# Patient Record
Sex: MALE | Race: WHITE | NOT HISPANIC OR LATINO | Employment: OTHER | URBAN - METROPOLITAN AREA
[De-identification: names, ages, dates, MRNs, and addresses within clinical notes are randomized per-mention and may not be internally consistent; named-entity substitution may affect disease eponyms.]

---

## 2017-03-02 DIAGNOSIS — R91.1 LUNG NODULE: Primary | ICD-10-CM

## 2017-03-27 RX ORDER — LISINOPRIL 20 MG/1
20 TABLET ORAL DAILY
Qty: 90 TABLET | Refills: 1 | Status: SHIPPED | OUTPATIENT
Start: 2017-03-27 | End: 2017-11-07 | Stop reason: SDUPTHER

## 2017-03-27 RX ORDER — COLCHICINE 0.6 MG/1
0.6 CAPSULE ORAL EVERY 12 HOURS SCHEDULED
Qty: 180 CAPSULE | Refills: 1 | Status: SHIPPED | OUTPATIENT
Start: 2017-03-27 | End: 2017-04-04 | Stop reason: SDUPTHER

## 2017-04-04 RX ORDER — COLCHICINE 0.6 MG/1
0.6 CAPSULE ORAL EVERY 12 HOURS SCHEDULED
Qty: 180 CAPSULE | Refills: 1 | OUTPATIENT
Start: 2017-04-04 | End: 2017-12-06 | Stop reason: SDUPTHER

## 2017-05-01 ENCOUNTER — APPOINTMENT (OUTPATIENT)
Dept: CT IMAGING | Facility: HOSPITAL | Age: 62
End: 2017-05-01
Attending: INTERNAL MEDICINE

## 2017-06-26 ENCOUNTER — APPOINTMENT (OUTPATIENT)
Dept: CT IMAGING | Facility: HOSPITAL | Age: 62
End: 2017-06-26
Attending: INTERNAL MEDICINE

## 2017-06-26 ENCOUNTER — HOSPITAL ENCOUNTER (OUTPATIENT)
Dept: CT IMAGING | Facility: HOSPITAL | Age: 62
Discharge: HOME | End: 2017-06-26
Attending: INTERNAL MEDICINE | Admitting: INTERNAL MEDICINE

## 2017-06-26 DIAGNOSIS — R91.1 LUNG NODULE: ICD-10-CM

## 2017-06-26 PROCEDURE — 71250 CT THORAX DX C-: CPT

## 2017-06-30 ENCOUNTER — TELEPHONE (OUTPATIENT)
Dept: PULMONOLOGY | Facility: CLINIC | Age: 62
End: 2017-06-30

## 2017-09-22 ENCOUNTER — CLINICAL SUPPORT (OUTPATIENT)
Dept: INTERNAL MEDICINE | Facility: CLINIC | Age: 62
End: 2017-09-22

## 2017-09-22 DIAGNOSIS — Z23 NEED FOR IMMUNIZATION AGAINST INFLUENZA: Primary | ICD-10-CM

## 2017-09-22 PROCEDURE — 90686 IIV4 VACC NO PRSV 0.5 ML IM: CPT | Performed by: INTERNAL MEDICINE

## 2017-09-22 PROCEDURE — 90471 IMMUNIZATION ADMIN: CPT | Performed by: INTERNAL MEDICINE

## 2017-11-07 ENCOUNTER — TELEPHONE (OUTPATIENT)
Dept: INTERNAL MEDICINE | Facility: CLINIC | Age: 62
End: 2017-11-07

## 2017-11-07 RX ORDER — INDOMETHACIN 25 MG/1
25 CAPSULE ORAL
Qty: 21 CAPSULE | Refills: 2 | Status: CANCELLED | OUTPATIENT
Start: 2017-11-07

## 2017-11-07 RX ORDER — LISINOPRIL 20 MG/1
20 TABLET ORAL DAILY
Qty: 30 TABLET | Refills: 0 | Status: SHIPPED | OUTPATIENT
Start: 2017-11-07 | End: 2017-11-07 | Stop reason: SDUPTHER

## 2017-11-07 RX ORDER — LISINOPRIL 20 MG/1
20 TABLET ORAL DAILY
Qty: 30 TABLET | Refills: 0 | Status: SHIPPED | OUTPATIENT
Start: 2017-11-07 | End: 2017-11-08 | Stop reason: SDUPTHER

## 2017-11-07 NOTE — TELEPHONE ENCOUNTER
REFILL REQUEST FOR LISINOPRIL 20MG TAB AND INDOCIN 25MG CAP. LISINOPRIL SENT. OK TO REFILL INDOCIN. ATTEMPTED TO CONTACT PATIENT TO SCHEDULED A F/U APPT, NO ANSWER. LAST 3 SCHEDULED APPTS. CANCELED.

## 2017-11-07 NOTE — TELEPHONE ENCOUNTER
When the patient has not been seen for this long with chronic high blood pressure or other chronic problems I will only give them 10 tablets of medication until they are seen.  No indomethacin as this can cause chronic kidney problems and he needs labs done.

## 2017-11-07 NOTE — TELEPHONE ENCOUNTER
PATIENT SCHEDULED APPT FOR DR ROBISON'S NEXT AVAILABLE, IN December. HE WOULD APPRECIATE THE 30 DAY SUPPLY FOR LISINOPRIL TO HOLD HIM UNTIL THEN.

## 2017-11-08 RX ORDER — LISINOPRIL 20 MG/1
20 TABLET ORAL DAILY
Qty: 30 TABLET | Refills: 0 | Status: SHIPPED | OUTPATIENT
Start: 2017-11-08 | End: 2017-12-06 | Stop reason: SDUPTHER

## 2017-12-06 ENCOUNTER — OFFICE VISIT (OUTPATIENT)
Dept: INTERNAL MEDICINE | Facility: CLINIC | Age: 62
End: 2017-12-06

## 2017-12-06 VITALS
TEMPERATURE: 97.9 F | WEIGHT: 179 LBS | OXYGEN SATURATION: 95 % | HEIGHT: 72 IN | SYSTOLIC BLOOD PRESSURE: 124 MMHG | HEART RATE: 66 BPM | DIASTOLIC BLOOD PRESSURE: 70 MMHG | BODY MASS INDEX: 24.24 KG/M2

## 2017-12-06 DIAGNOSIS — I10 ESSENTIAL HYPERTENSION: Primary | ICD-10-CM

## 2017-12-06 DIAGNOSIS — Z11.59 NEED FOR HEPATITIS C SCREENING TEST: ICD-10-CM

## 2017-12-06 DIAGNOSIS — E55.9 VITAMIN D DEFICIENCY: ICD-10-CM

## 2017-12-06 LAB
25(OH)D3+25(OH)D2 SERPL-MCNC: 43.7 NG/ML
ALBUMIN SERPL-MCNC: 4.7 G/DL (ref 3.5–5)
ALBUMIN/GLOB SERPL: 1.8 G/DL (ref 1–2)
ALP SERPL-CCNC: 63 U/L (ref 38–126)
ALT SERPL-CCNC: 37 U/L (ref 13–69)
AST SERPL-CCNC: 25 U/L (ref 15–46)
BASOPHILS # BLD AUTO: 0.03 10*3/MM3 (ref 0–0.2)
BASOPHILS NFR BLD AUTO: 0.4 % (ref 0–2.5)
BILIRUB SERPL-MCNC: 1 MG/DL (ref 0.2–1.3)
BUN SERPL-MCNC: 29 MG/DL (ref 7–20)
BUN/CREAT SERPL: 26.4 (ref 6.3–21.9)
CALCIUM SERPL-MCNC: 10.5 MG/DL (ref 8.4–10.2)
CHLORIDE SERPL-SCNC: 99 MMOL/L (ref 98–107)
CHOLEST SERPL-MCNC: 145 MG/DL (ref 0–199)
CO2 SERPL-SCNC: 29 MMOL/L (ref 26–30)
CREAT SERPL-MCNC: 1.1 MG/DL (ref 0.6–1.3)
EOSINOPHIL # BLD AUTO: 0.05 10*3/MM3 (ref 0–0.7)
EOSINOPHIL NFR BLD AUTO: 0.6 % (ref 0–7)
ERYTHROCYTE [DISTWIDTH] IN BLOOD BY AUTOMATED COUNT: 12.6 % (ref 11.5–14.5)
GFR SERPLBLD CREATININE-BSD FMLA CKD-EPI: 68 ML/MIN/1.73
GFR SERPLBLD CREATININE-BSD FMLA CKD-EPI: 82 ML/MIN/1.73
GLOBULIN SER CALC-MCNC: 2.6 GM/DL
GLUCOSE SERPL-MCNC: 109 MG/DL (ref 74–98)
HCT VFR BLD AUTO: 49 % (ref 42–52)
HDLC SERPL-MCNC: 40 MG/DL (ref 40–60)
HGB BLD-MCNC: 16.3 G/DL (ref 14–18)
IMM GRANULOCYTES # BLD: 0.02 10*3/MM3 (ref 0–0.06)
IMM GRANULOCYTES NFR BLD: 0.3 % (ref 0–0.6)
LDLC SERPL CALC-MCNC: 88 MG/DL (ref 0–99)
LYMPHOCYTES # BLD AUTO: 1.38 10*3/MM3 (ref 0.6–3.4)
LYMPHOCYTES NFR BLD AUTO: 17.5 % (ref 10–50)
MCH RBC QN AUTO: 30.2 PG (ref 27–31)
MCHC RBC AUTO-ENTMCNC: 33.3 G/DL (ref 30–37)
MCV RBC AUTO: 90.9 FL (ref 80–94)
MONOCYTES # BLD AUTO: 0.4 10*3/MM3 (ref 0–0.9)
MONOCYTES NFR BLD AUTO: 5.1 % (ref 0–12)
NEUTROPHILS # BLD AUTO: 6 10*3/MM3 (ref 2–6.9)
NEUTROPHILS NFR BLD AUTO: 76.1 % (ref 37–80)
NRBC BLD AUTO-RTO: 0 /100 WBC (ref 0–0)
PLATELET # BLD AUTO: 227 10*3/MM3 (ref 130–400)
POTASSIUM SERPL-SCNC: 4.8 MMOL/L (ref 3.5–5.1)
PROT SERPL-MCNC: 7.3 G/DL (ref 6.3–8.2)
RBC # BLD AUTO: 5.39 10*6/MM3 (ref 4.7–6.1)
SODIUM SERPL-SCNC: 141 MMOL/L (ref 137–145)
TRIGL SERPL-MCNC: 86 MG/DL
VLDLC SERPL CALC-MCNC: 17.2 MG/DL
WBC # BLD AUTO: 7.88 10*3/MM3 (ref 4.8–10.8)

## 2017-12-06 PROCEDURE — 99213 OFFICE O/P EST LOW 20 MIN: CPT | Performed by: INTERNAL MEDICINE

## 2017-12-06 RX ORDER — INDOMETHACIN 25 MG/1
25 CAPSULE ORAL
Qty: 270 CAPSULE | Refills: 1 | Status: SHIPPED | OUTPATIENT
Start: 2017-12-06 | End: 2020-09-01

## 2017-12-06 RX ORDER — LISINOPRIL 20 MG/1
20 TABLET ORAL DAILY
Qty: 90 TABLET | Refills: 1 | Status: SHIPPED | OUTPATIENT
Start: 2017-12-06 | End: 2018-07-03 | Stop reason: SDUPTHER

## 2017-12-06 RX ORDER — COLCHICINE 0.6 MG/1
0.6 CAPSULE ORAL EVERY 12 HOURS SCHEDULED
Qty: 180 CAPSULE | Refills: 1 | Status: SHIPPED | OUTPATIENT
Start: 2017-12-06 | End: 2021-10-11

## 2017-12-06 NOTE — PROGRESS NOTES
"Chief Complaint   Patient presents with   • Follow-up     for HTN. No new complaints.      Subjective   Umberto Vigil is a 62 y.o. male.     HPI Comments: Here for follow up of HTN, gout, vit D def, kidney stones, melanoma of left thigh (grade 3) , squamous and basal skin cancer, benign lung lesion per bronchoscopy.   He may have a gout flare every few months.  He may use a few tabs of indocin and colchicine and then flare is resolved.    He has seen Dr Hendrix earlier this yr for mass in lungs.  Has had CT with bronchoscopy.  The mass is benign.   He is taking a MVI and vit D every third day.    No recent kidney stones.   He checks BP every few weeks, about the same as it is today.  No CP, SOB, edema or palpitations.    He does not always awaken to urinate.  No hesitancy or urgency.  No FH of prostate cancer.  His mother  of colon cancer, so did his maternal GM.   He is walking and doing some wt lifting for exercise.   He sees eye doctor and dentist regularly.   His last colonoscopy was in  and is due in  with Dr Almeida.   He has had his flu shot.        The following portions of the patient's history were reviewed and updated as appropriate: allergies, current medications, past family history, past medical history, past social history, past surgical history and problem list.    Review of Systems   Respiratory: Negative for shortness of breath.    Cardiovascular: Negative for chest pain, palpitations and leg swelling.   Gastrointestinal: Negative.    Genitourinary: Negative.    Musculoskeletal:        Occasional gout flares   Psychiatric/Behavioral: The patient is nervous/anxious.    All other systems reviewed and are negative.      Objective   /70  Pulse 66  Temp 97.9 °F (36.6 °C)  Ht 181.6 cm (71.5\")  Wt 81.2 kg (179 lb)  SpO2 95%  BMI 24.62 kg/m2  Body mass index is 24.62 kg/(m^2).  Physical Exam   Constitutional: He is oriented to person, place, and time. He appears well-developed " and well-nourished.   HENT:   Head: Normocephalic and atraumatic.   Right Ear: External ear normal.   Left Ear: External ear normal.   Mouth/Throat: Oropharynx is clear and moist.   Eyes: Conjunctivae and EOM are normal. Pupils are equal, round, and reactive to light.   Neck: Normal range of motion. Neck supple. No thyromegaly present.   Cardiovascular: Normal rate, regular rhythm, normal heart sounds and intact distal pulses.    No murmur heard.  Pulmonary/Chest: Effort normal and breath sounds normal. No respiratory distress.   Abdominal: Soft. Bowel sounds are normal.   Musculoskeletal: He exhibits no edema.   Lymphadenopathy:     He has no cervical adenopathy.   Neurological: He is alert and oriented to person, place, and time. No cranial nerve deficit.   Psychiatric: He has a normal mood and affect. Judgment normal.   Nursing note and vitals reviewed.      Assessment/Plan   Umberto Vigil is here today and the following problems have been addressed:      Umberto was seen today for follow-up.    Diagnoses and all orders for this visit:    Essential hypertension  -     CBC & Differential  -     Comprehensive Metabolic Panel  -     Lipid Panel    Vitamin D deficiency  -     Vitamin D 25 Hydroxy    Need for hepatitis C screening test  -     Hepatitis C Antibody    Other orders  -     colchicine 0.6 MG capsule capsule; Take 1 capsule by mouth Every 12 (Twelve) Hours.  -     indomethacin (INDOCIN) 25 MG capsule; Take 1 capsule by mouth 3 (Three) Times a Day With Meals.  -     lisinopril (PRINIVIL,ZESTRIL) 20 MG tablet; Take 1 tablet by mouth Daily.      Follow heart healthy/low salt diet  Avoid processed foods  Monitor blood pressure as discussed  Exercise as tolerated up to 30 minutes 5 days per week  Take all medications as prescribed  Labs as noted  Continue daily vit D  Will call patient with lab results, as he is anxious about hepatitis C antibody screening test    RTC 6 mo      Please note that portions of this  note were completed with a voice recognition program.  Efforts were made to edit dictation, but occasionally words are mistranscribed.

## 2017-12-07 ENCOUNTER — TELEPHONE (OUTPATIENT)
Dept: INTERNAL MEDICINE | Facility: CLINIC | Age: 62
End: 2017-12-07

## 2017-12-07 LAB — HCV AB S/CO SERPL IA: <0.1 S/CO RATIO (ref 0–0.9)

## 2017-12-07 NOTE — TELEPHONE ENCOUNTER
----- Message from Marilyn K Vermeesch, MD sent at 12/7/2017  7:31 AM EST -----  Please call patient with labs.  Hepatitis C antibody is negative.  Cholesterol panel and vitamin D levels are normal.  Complete blood count is normal.  He appears slightly dehydrated likely due to fasting from labs.  Blood sugar is slightly elevated   at 109.  Last year his blood sugar was slightly elevated at 106.  Normal blood sugar is less than 100.  I would like you to call lab and ask them to add a hemoglobin A1c to blood drawn yesterday.  Please tell him to avoid excessive amounts of sweets   and high carbohydrate foods such as bread, pasta, rice and potatoes.  In addition his calcium level is minimally elevated.  Please tell him to check the amount of vitamin D in his multivitamin.  If there is 800–1000 units I would like him to disconti  nue the extra dose of vitamin D that he takes every third day, as this may be the cause of his higher calcium level.  We will repeat his calcium level at his next office visit in 6 months.  We will call him with the hemoglobin A1c level in a few days  .

## 2017-12-08 ENCOUNTER — TELEPHONE (OUTPATIENT)
Dept: INTERNAL MEDICINE | Facility: CLINIC | Age: 62
End: 2017-12-08

## 2017-12-08 NOTE — TELEPHONE ENCOUNTER
----- Message from Marilyn K Vermeesch, MD sent at 12/8/2017  7:25 AM EST -----  Please tell patient hemoglobin A1c is 5.4 consistent with average blood sugar of 115.  Again explained to him normal is less than 100 and he needs to avoid excessive sweets and carbohydrates as discussed above.

## 2017-12-18 LAB
HBA1C MFR BLD: 5.4 %
WRITTEN AUTHORIZATION: NORMAL

## 2018-07-03 RX ORDER — LISINOPRIL 20 MG/1
TABLET ORAL
Qty: 30 TABLET | Refills: 0 | Status: SHIPPED | OUTPATIENT
Start: 2018-07-03 | End: 2018-08-13 | Stop reason: SDUPTHER

## 2018-07-18 ENCOUNTER — OFFICE VISIT (OUTPATIENT)
Dept: INTERNAL MEDICINE | Facility: CLINIC | Age: 63
End: 2018-07-18

## 2018-07-18 VITALS
HEART RATE: 67 BPM | OXYGEN SATURATION: 97 % | SYSTOLIC BLOOD PRESSURE: 166 MMHG | DIASTOLIC BLOOD PRESSURE: 100 MMHG | TEMPERATURE: 97 F

## 2018-07-18 DIAGNOSIS — T14.8XXA MUSCLE STRAIN: ICD-10-CM

## 2018-07-18 DIAGNOSIS — M77.8 TENDONITIS OF ELBOW, RIGHT: Primary | ICD-10-CM

## 2018-07-18 PROBLEM — Z11.59 NEED FOR HEPATITIS C SCREENING TEST: Status: RESOLVED | Noted: 2017-12-06 | Resolved: 2018-07-18

## 2018-07-18 PROCEDURE — 99214 OFFICE O/P EST MOD 30 MIN: CPT | Performed by: INTERNAL MEDICINE

## 2018-07-18 RX ORDER — METHOCARBAMOL 500 MG/1
500 TABLET, FILM COATED ORAL 2 TIMES DAILY PRN
Qty: 30 TABLET | Refills: 0 | Status: SHIPPED | OUTPATIENT
Start: 2018-07-18 | End: 2018-07-23 | Stop reason: ALTCHOICE

## 2018-07-18 NOTE — PROGRESS NOTES
Chief Complaint   Patient presents with   • Shoulder Pain     right, X 1 week.     Subjective   Umberto Vigil is a 63 y.o. male.     Pt here with complaints of right shoulder pain for one week.   Last week he power washed an awning for an hour or more and noted pain the next day in right shoulder and posterior right upper back.  He has been doing some yard work also.  Has been doing a lot of weed eating, all with right hand.   The pain resolved the following day.  The shoulder pain has been coming and going with use of  mg TID over weekend, then less past few days.  The pain is less later in the day.    Last night the pain recurred during sleep.  Today he has pain in shoulder and forearm.  It radiates to hand also and causes some weakness.           The following portions of the patient's history were reviewed and updated as appropriate: allergies, current medications, past family history, past medical history, past social history, past surgical history and problem list.    Review of Systems   Musculoskeletal: Positive for arthralgias, back pain and myalgias. Negative for joint swelling, neck pain and neck stiffness.        Right shoulder, right upper back, right forearm and right hand pain   Neurological: Positive for weakness. Negative for numbness.       Objective   /100   Pulse 67   Temp 97 °F (36.1 °C)   SpO2 97%   There is no height or weight on file to calculate BMI.  Physical Exam   Constitutional: He appears well-developed and well-nourished.   HENT:   Head: Normocephalic and atraumatic.   Eyes: Pupils are equal, round, and reactive to light. EOM are normal.   Pulmonary/Chest: Effort normal.   Musculoskeletal:   Right shoulder with full range of motion and no significant pain, negative drop test, pain over right rhomboid muscle to palpation, no pain over trapezius muscle, mild pain to palpation of deltoid muscle, no pain over anterior biceps tendon, no pain over acromioclavicular joint,  pain over forearm muscles to palpation, mild pain with supination and pronation of forearm   Neurological: He is alert.   Psychiatric: He has a normal mood and affect. His behavior is normal. Judgment and thought content normal.   Nursing note and vitals reviewed.      Assessment/Plan   Umberto Vigil is here today and the following problems have been addressed:      Umberto was seen today for shoulder pain.    Diagnoses and all orders for this visit:    Tendonitis of elbow, right    Muscle strain    Other orders  -     diclofenac (VOLTAREN) 50 MG EC tablet; Take 1 tablet by mouth 2 (Two) Times a Day As Needed (pain).  -     methocarbamol (ROBAXIN) 500 MG tablet; Take 1 tablet by mouth 2 (Two) Times a Day As Needed for Muscle Spasms.    recommend BID diclofenac and robaxin, take with food  Recommend heat to back follow by tennis ball massage  Limit use of RUE due to tendonitis of forearm  Recommend he avoid overuse of right upper extremity at this time due to tendinitis and muscle spasm    RTC prn if sxs worsen or persist    Please note that portions of this note were completed with a voice recognition program.  Efforts were made to edit dictation, but occasionally words are mistranscribed.

## 2018-07-23 ENCOUNTER — TELEPHONE (OUTPATIENT)
Dept: INTERNAL MEDICINE | Facility: CLINIC | Age: 63
End: 2018-07-23

## 2018-07-23 RX ORDER — TIZANIDINE 2 MG/1
2 TABLET ORAL 2 TIMES DAILY PRN
Qty: 30 TABLET | Refills: 1 | Status: SHIPPED | OUTPATIENT
Start: 2018-07-23 | End: 2019-05-03 | Stop reason: SDUPTHER

## 2018-07-23 NOTE — TELEPHONE ENCOUNTER
Please call in tizanidine 2 mg by mouth twice a day when necessary muscle pain #30 tablets with one refill

## 2018-07-23 NOTE — TELEPHONE ENCOUNTER
Patient called this morning stating that the Methocarbamal he was prescribed was not able to be filled, due to being on back order at a few different pharmacies. He states he was able to get 8, but no luck otherwise. He'd like a call back to know if something else could be ordered.

## 2018-07-27 RX ORDER — DICLOFENAC SODIUM 75 MG/1
75 TABLET, DELAYED RELEASE ORAL 2 TIMES DAILY PRN
Qty: 20 TABLET | Refills: 0 | Status: SHIPPED | OUTPATIENT
Start: 2018-07-27 | End: 2018-08-06 | Stop reason: SDUPTHER

## 2018-07-27 NOTE — TELEPHONE ENCOUNTER
You may call in diclofenac 75 mg by mouth twice a day #20 tablets with one refill.  If he continues to have pain after 2 weeks of this medication I recommend he be seen again for evaluation

## 2018-07-27 NOTE — TELEPHONE ENCOUNTER
Patient called and states he has been having to take diclofenac 3 times a day due to pain. He is wanting to know if he can get the dose increased so he does not have to take it as often.

## 2018-08-06 ENCOUNTER — OFFICE VISIT (OUTPATIENT)
Dept: INTERNAL MEDICINE | Facility: CLINIC | Age: 63
End: 2018-08-06

## 2018-08-06 VITALS
TEMPERATURE: 98 F | SYSTOLIC BLOOD PRESSURE: 136 MMHG | OXYGEN SATURATION: 98 % | HEART RATE: 67 BPM | DIASTOLIC BLOOD PRESSURE: 80 MMHG

## 2018-08-06 DIAGNOSIS — M77.8 TENDONITIS OF ELBOW, RIGHT: Primary | ICD-10-CM

## 2018-08-06 PROCEDURE — 99213 OFFICE O/P EST LOW 20 MIN: CPT | Performed by: INTERNAL MEDICINE

## 2018-08-06 RX ORDER — METHYLPREDNISOLONE 4 MG/1
TABLET ORAL
Qty: 1 EACH | Refills: 0 | Status: SHIPPED | OUTPATIENT
Start: 2018-08-06 | End: 2018-10-15

## 2018-08-06 RX ORDER — DICLOFENAC SODIUM 75 MG/1
75 TABLET, DELAYED RELEASE ORAL 2 TIMES DAILY PRN
Qty: 40 TABLET | Refills: 2 | Status: SHIPPED | OUTPATIENT
Start: 2018-08-06 | End: 2018-10-15

## 2018-08-06 NOTE — PROGRESS NOTES
Chief Complaint   Patient presents with   • Shoulder Pain     and elbow pain, right.      Subjective   Umberto Vigil is a 63 y.o. male.     Pt here today with complaints of right shoulder and elbow pain.   He is having pain in right forearm and shoulder.  He is trying to avoid overuse of right arm as instructed.   Last visit he was given diclofenac and zanaflex and it was helpful.   He is getting some numbness in right lateral 4th and 5th fingers.  He denies any neck pain.  He does have some scapula pain.          The following portions of the patient's history were reviewed and updated as appropriate: allergies, current medications, past family history, past medical history, past social history, past surgical history and problem list.    Review of Systems   Musculoskeletal: Positive for arthralgias and myalgias. Negative for joint swelling, neck pain and neck stiffness.   Neurological: Positive for numbness.       Objective   /80   Pulse 67   Temp 98 °F (36.7 °C)   SpO2 98%   There is no height or weight on file to calculate BMI.  Physical Exam   Constitutional: He is oriented to person, place, and time. He appears well-developed and well-nourished.   HENT:   Head: Normocephalic and atraumatic.   Eyes: Pupils are equal, round, and reactive to light. EOM are normal.   Pulmonary/Chest: Effort normal.   Musculoskeletal:   Right shoulder full range of motion, right elbow with pain over lateral epicondyles, pain with palpation of mid forearm muscles, mild pain with suppination and and pronation   Neurological: He is alert and oriented to person, place, and time.   Psychiatric: He has a normal mood and affect. His behavior is normal. Judgment and thought content normal.   Nursing note and vitals reviewed.      Assessment/Plan   Umberto Vigil is here today and the following problems have been addressed:      Umberto was seen today for shoulder pain.    Diagnoses and all orders for this visit:    Tendonitis of  elbow, right    Other orders  -     MethylPREDNISolone (MEDROLIMTIAZ,) 4 MG tablet; Take as directed on package instructions.  -     diclofenac (VOLTAREN) 75 MG EC tablet; Take 1 tablet by mouth 2 (Two) Times a Day As Needed (pain).      Given RF of diclofenac to take prn with food  May take tizanidine if he feels this is helpful  Also given medrol dose imtiaz  Recommend frictional ice rub if painful or heat late in day    RTC prn  Please note that portions of this note were completed with a voice recognition program.  Efforts were made to edit dictation, but occasionally words are mistranscribed.

## 2018-08-09 ENCOUNTER — TELEPHONE (OUTPATIENT)
Dept: INTERNAL MEDICINE | Facility: CLINIC | Age: 63
End: 2018-08-09

## 2018-08-09 NOTE — TELEPHONE ENCOUNTER
May RF lisinopril 90 day with one RF. He should still have some of medrol regina left.  Do not want to call in more unless he is not better next week.

## 2018-08-09 NOTE — TELEPHONE ENCOUNTER
Patient is requesting additional prednizone because it is helping.  He also needs lacinopril refilled for 90 days.

## 2018-08-13 RX ORDER — LISINOPRIL 20 MG/1
20 TABLET ORAL DAILY
Qty: 90 TABLET | Refills: 1 | Status: SHIPPED | OUTPATIENT
Start: 2018-08-13 | End: 2019-03-08 | Stop reason: SDUPTHER

## 2018-09-18 ENCOUNTER — FLU SHOT (OUTPATIENT)
Dept: INTERNAL MEDICINE | Facility: CLINIC | Age: 63
End: 2018-09-18

## 2018-09-18 DIAGNOSIS — Z23 NEED FOR VACCINATION: Primary | ICD-10-CM

## 2018-09-18 PROCEDURE — 90662 IIV NO PRSV INCREASED AG IM: CPT | Performed by: INTERNAL MEDICINE

## 2018-09-18 PROCEDURE — 90472 IMMUNIZATION ADMIN EACH ADD: CPT | Performed by: INTERNAL MEDICINE

## 2018-09-18 PROCEDURE — 90471 IMMUNIZATION ADMIN: CPT | Performed by: INTERNAL MEDICINE

## 2018-09-18 PROCEDURE — 90632 HEPA VACCINE ADULT IM: CPT | Performed by: INTERNAL MEDICINE

## 2019-03-08 RX ORDER — LISINOPRIL 20 MG/1
20 TABLET ORAL DAILY
Qty: 90 TABLET | Refills: 0 | Status: SHIPPED | OUTPATIENT
Start: 2019-03-08 | End: 2019-05-03 | Stop reason: SDUPTHER

## 2019-03-25 ENCOUNTER — OFFICE VISIT (OUTPATIENT)
Dept: INTERNAL MEDICINE | Facility: CLINIC | Age: 64
End: 2019-03-25

## 2019-03-25 VITALS
TEMPERATURE: 97.7 F | OXYGEN SATURATION: 98 % | HEART RATE: 73 BPM | HEIGHT: 71 IN | WEIGHT: 187 LBS | BODY MASS INDEX: 26.18 KG/M2 | RESPIRATION RATE: 15 BRPM | DIASTOLIC BLOOD PRESSURE: 95 MMHG | SYSTOLIC BLOOD PRESSURE: 151 MMHG

## 2019-03-25 DIAGNOSIS — H60.501 ACUTE OTITIS EXTERNA OF RIGHT EAR, UNSPECIFIED TYPE: Primary | ICD-10-CM

## 2019-03-25 DIAGNOSIS — I10 ESSENTIAL HYPERTENSION: ICD-10-CM

## 2019-03-25 DIAGNOSIS — H61.21 IMPACTED CERUMEN OF RIGHT EAR: ICD-10-CM

## 2019-03-25 PROCEDURE — 69209 REMOVE IMPACTED EAR WAX UNI: CPT | Performed by: NURSE PRACTITIONER

## 2019-03-25 PROCEDURE — 99214 OFFICE O/P EST MOD 30 MIN: CPT | Performed by: NURSE PRACTITIONER

## 2019-04-15 ENCOUNTER — OFFICE VISIT (OUTPATIENT)
Dept: INTERNAL MEDICINE | Facility: CLINIC | Age: 64
End: 2019-04-15

## 2019-04-15 VITALS
OXYGEN SATURATION: 96 % | HEART RATE: 63 BPM | SYSTOLIC BLOOD PRESSURE: 130 MMHG | DIASTOLIC BLOOD PRESSURE: 82 MMHG | TEMPERATURE: 98.1 F

## 2019-04-15 DIAGNOSIS — M54.50 ACUTE MIDLINE LOW BACK PAIN WITHOUT SCIATICA: Primary | ICD-10-CM

## 2019-04-15 PROBLEM — M77.8 TENDONITIS OF ELBOW, RIGHT: Status: RESOLVED | Noted: 2018-07-18 | Resolved: 2019-04-15

## 2019-04-15 PROBLEM — T14.8XXA MUSCLE STRAIN: Status: RESOLVED | Noted: 2018-07-18 | Resolved: 2019-04-15

## 2019-04-15 PROCEDURE — 99214 OFFICE O/P EST MOD 30 MIN: CPT | Performed by: INTERNAL MEDICINE

## 2019-04-15 NOTE — PROGRESS NOTES
Chief Complaint   Patient presents with   • Abstract     Pt would like right ear looked at, states he had his ears flushed on 3/25. Pt states he's been having LBP X 1 month.      Subjective   Umberto Vigil is a 64 y.o. male.     Here with complaints of LBP for past four months.   He was doing a lot of moving furniture at that time and it started to bother him then.   The back is more painful in AM, very stiff.  After an hour it starts to feel better, less stiff and painful.  Later in the day it may worsen on some days.   The pain may radiate to both legs/lateral hips.    No FH of RA that he is aware of.   He has taken IBU for this and it is helpful.  Has been taking 400-800 mg and it works well.   A few weeks ago he went a week with minimal pain, did some yard work and then the pain recurred.    No NTB of legs.  No bowel or bladder complaints.   No weakness or loss of balance    He was here several weeks ago and was given antibiotics after his right ear was flushed and tympanic membrane was noted to be red.  He states he had no earache at that time.  He now complains of mild right ear pain.           The following portions of the patient's history were reviewed and updated as appropriate: allergies, current medications, past family history, past medical history, past social history, past surgical history and problem list.    Review of Systems   Constitutional: Negative for activity change and unexpected weight change.   HENT: Positive for ear pain.    Gastrointestinal: Negative.    Genitourinary: Negative.    Musculoskeletal: Positive for back pain. Negative for gait problem.   Neurological: Negative for numbness.       Objective   /82   Pulse 63   Temp 98.1 °F (36.7 °C)   SpO2 96%   There is no height or weight on file to calculate BMI.  Physical Exam   Constitutional: He is oriented to person, place, and time. He appears well-developed and well-nourished.   Pleasant gentleman who is seated upright and  leaning forward in chair due to low back pain on my arrival to room   HENT:   Head: Normocephalic and atraumatic.   Right Ear: External ear normal.   Left Ear: External ear normal.   Mouth/Throat: Oropharynx is clear and moist. No oropharyngeal exudate.   Right tympanic membrane is slightly erythematous over superior aspect and is minimally dull, left tympanic membrane is normal, no effusions noted   Eyes: EOM are normal. Pupils are equal, round, and reactive to light.   Pulmonary/Chest: Effort normal. No respiratory distress.   Musculoskeletal:   Vertebral tenderness to palpation over mid lumbar spine L2-3 area, mild right sacroiliac tenderness.  No pain with straight leg raise or any hip movement.  Lower extremity muscle strength is 5/5, deep tendon reflexes +1 and symmetric, normal gait   Neurological: He is alert and oriented to person, place, and time. He displays normal reflexes. No cranial nerve deficit or sensory deficit. He exhibits normal muscle tone. Coordination normal.   Psychiatric: He has a normal mood and affect. His behavior is normal. Judgment and thought content normal.   Nursing note and vitals reviewed.      Assessment/Plan   Umberto ISH Yaritza is here today and the following problems have been addressed:      Umberto was seen today for abstract.    Diagnoses and all orders for this visit:    Acute midline low back pain without sciatica  -     Cyclic Citrul Peptide Antibody, IgG / IgA  -     Sedimentation Rate  -     C-reactive Protein  -     XR Spine Lumbar 4+ View; Future      Labs as noted to rule out underlying rheumatoid arthritis as cause of low back pain and stiffness  X-ray of low back due to chronicity of symptoms, this may be DJD versus DDD of low back  Recommend to heat to low back for 20 minutes 3 times daily as needed  Recommend he discontinue ibuprofen and use only Tylenol as needed for low back pain due to underlying history of hypertension and aortic aneurysm  Recommend Zyrtec for  right ear pain likely due to allergies, very minimal erythema on exam  Will contact patient with lab and x-ray results and further plan of care    Return to clinic as needed  Please note that portions of this note were completed with a voice recognition program.  Efforts were made to edit dictation, but occasionally words are mistranscribed.

## 2019-04-17 ENCOUNTER — TELEPHONE (OUTPATIENT)
Dept: INTERNAL MEDICINE | Facility: CLINIC | Age: 64
End: 2019-04-17

## 2019-04-17 NOTE — TELEPHONE ENCOUNTER
Patient called with a question about his blood work.  He is having a flare up of gout and is wondering if he should wait until it goes away or if it's ok to have the blood work done during the flare up?  Phone number verified.  Please advise.  Thank you.

## 2019-04-17 NOTE — TELEPHONE ENCOUNTER
Please tell him I am happy that he called, the flareup will cause some of his lab work to be abnormal.  Please tell him to wait on his lab work until gout flare has resolved.  I assume he is taking his Indocin and colchicine as directed for his gout flare.  This may be very helpful for his back pain also.

## 2019-05-03 ENCOUNTER — OFFICE VISIT (OUTPATIENT)
Dept: INTERNAL MEDICINE | Facility: CLINIC | Age: 64
End: 2019-05-03

## 2019-05-03 VITALS
TEMPERATURE: 98 F | HEART RATE: 76 BPM | SYSTOLIC BLOOD PRESSURE: 142 MMHG | OXYGEN SATURATION: 97 % | HEIGHT: 71 IN | WEIGHT: 185 LBS | DIASTOLIC BLOOD PRESSURE: 88 MMHG | BODY MASS INDEX: 25.9 KG/M2

## 2019-05-03 DIAGNOSIS — Z12.5 SCREENING PSA (PROSTATE SPECIFIC ANTIGEN): ICD-10-CM

## 2019-05-03 DIAGNOSIS — E55.9 VITAMIN D DEFICIENCY: ICD-10-CM

## 2019-05-03 DIAGNOSIS — I10 ESSENTIAL HYPERTENSION: ICD-10-CM

## 2019-05-03 DIAGNOSIS — Z00.00 MEDICARE ANNUAL WELLNESS VISIT, INITIAL: Primary | ICD-10-CM

## 2019-05-03 PROCEDURE — G0009 ADMIN PNEUMOCOCCAL VACCINE: HCPCS | Performed by: INTERNAL MEDICINE

## 2019-05-03 PROCEDURE — G0438 PPPS, INITIAL VISIT: HCPCS | Performed by: INTERNAL MEDICINE

## 2019-05-03 PROCEDURE — 93000 ELECTROCARDIOGRAM COMPLETE: CPT | Performed by: INTERNAL MEDICINE

## 2019-05-03 PROCEDURE — 90670 PCV13 VACCINE IM: CPT | Performed by: INTERNAL MEDICINE

## 2019-05-03 RX ORDER — LISINOPRIL 20 MG/1
20 TABLET ORAL DAILY
Qty: 90 TABLET | Refills: 3 | Status: SHIPPED | OUTPATIENT
Start: 2019-05-03 | End: 2020-06-23 | Stop reason: SDUPTHER

## 2019-05-03 RX ORDER — TIZANIDINE 2 MG/1
2 TABLET ORAL 2 TIMES DAILY PRN
Qty: 60 TABLET | Refills: 1 | Status: SHIPPED | OUTPATIENT
Start: 2019-05-03 | End: 2021-10-11

## 2019-05-03 NOTE — PROGRESS NOTES
QUICK REFERENCE INFORMATION:  The ABCs of the Annual Wellness Visit    Initial Medicare Wellness Visit    HEALTH RISK ASSESSMENT    : 1955    Recent Hospitalizations:  No hospitalization(s) within the last year..  ccc      Current Medical Providers:  Patient Care Team:  Vermeesch, Marilyn K, MD as PCP - General  Vermeesch, Marilyn K, MD as PCP - Family Medicine        Smoking Status:  Social History     Tobacco Use   Smoking Status Former Smoker   • Types: Cigarettes   • Last attempt to quit:    • Years since quittin.3       Alcohol Consumption:  Social History     Substance and Sexual Activity   Alcohol Use No       Depression Screen:   PHQ-2/PHQ-9 Depression Screening 5/3/2019   Little interest or pleasure in doing things 0   Feeling down, depressed, or hopeless 0   Total Score 0       Health Habits and Functional and Cognitive Screening:  Functional & Cognitive Status 5/3/2019   Do you have difficulty preparing food and eating? No   Do you have difficulty bathing yourself, getting dressed or grooming yourself? No   Do you have difficulty using the toilet? No   Do you have difficulty moving around from place to place? No   Do you have trouble with steps or getting out of a bed or a chair? No   In the past year have you fallen or experienced a near fall? No   Current Diet Well Balanced Diet   Dental Exam Up to date   Eye Exam Up to date   Exercise (times per week) 3 times per week   Current Exercise Activities Include Running/Jogging   Do you need help using the phone?  No   Are you deaf or do you have serious difficulty hearing?  No   Do you need help with transportation? No   Do you need help shopping? No   Do you need help preparing meals?  No   Do you need help with housework?  No   Do you need help with laundry? No   Do you need help taking your medications? No   Do you need help managing money? No   Do you ever drive or ride in a car without wearing a seat belt? No   Have you felt unusual  stress, anger or loneliness in the last month? No   Who do you live with? Spouse   If you need help, do you have trouble finding someone available to you? No   Do you have difficulty concentrating, remembering or making decisions? No           Does the patient have evidence of cognitive impairment? No    Asiprin use counseling: Does not need ASA (and currently is not on it)      Recent Lab Results:    Lab Results   Component Value Date     (H) 12/06/2017     Lab Results   Component Value Date    HGBA1C 5.40 12/06/2017     Lab Results   Component Value Date    TRIG 86 12/06/2017    HDL 40 12/06/2017    VLDL 17.2 12/06/2017           Age-appropriate Screening Schedule:  Refer to the list below for future screening recommendations based on patient's age, sex and/or medical conditions. Orders for these recommended tests are listed in the plan section. The patient has been provided with a written plan.    Health Maintenance   Topic Date Due   • ZOSTER VACCINE (2 of 2) 02/25/2013   • PNEUMOCOCCAL VACCINES (65+ LOW/MEDIUM RISK) (1 of 2 - PCV13) 02/15/2019   • INFLUENZA VACCINE  08/01/2019   • COLONOSCOPY  09/30/2020   • TDAP/TD VACCINES (2 - Td) 10/15/2028        Subjective   History of Present Illness    Umberto Vigil is a 64 y.o. male who presents for an Annual Wellness Visit.  PMH of HTN, allergies, vit D def, gout and LBP.  BP is elevated today.  Home reads run 130s/80s.  He had recent gout flare and back pain.  He has been taking some indocin and zanaflex, pain is much improved.  He takes a MVI, but not vit D alone.  He is taking zyrtec for allergies.       The following portions of the patient's history were reviewed and updated as appropriate: allergies, current medications, past family history, past medical history, past social history, past surgical history and problem list.    Outpatient Medications Prior to Visit   Medication Sig Dispense Refill   • colchicine 0.6 MG capsule capsule Take 1 capsule by  mouth Every 12 (Twelve) Hours. 180 capsule 1   • indomethacin (INDOCIN) 25 MG capsule Take 1 capsule by mouth 3 (Three) Times a Day With Meals. 270 capsule 1   • lisinopril (PRINIVIL,ZESTRIL) 20 MG tablet Take 1 tablet by mouth Daily. 200001 90 tablet 0   • tiZANidine (ZANAFLEX) 2 MG tablet Take 1 tablet by mouth 2 (Two) Times a Day As Needed for Muscle Spasms. 30 tablet 1     No facility-administered medications prior to visit.        Patient Active Problem List   Diagnosis   • Vitamin D deficiency   • Hypertension   • Gout   • Abdominal aortic aneurysm (CMS/HCC)   • Allergic rhinitis   • Acute midline low back pain without sciatica       Advance Care Planning:  Patient has an advance directive - a copy has not been provided. Have asked the patient to send this to us to add to record    Identification of Risk Factors:  Risk factors include: weight  and cardiovascular risk.    Review of Systems   Constitutional: Negative.    HENT: Negative.    Eyes: Negative.    Respiratory: Negative.    Cardiovascular: Negative.    Gastrointestinal: Negative.    Endocrine: Negative.    Genitourinary: Negative.    Musculoskeletal: Positive for back pain.   Skin: Negative.    Allergic/Immunologic: Positive for environmental allergies.   Neurological: Negative.    Hematological: Negative.    Psychiatric/Behavioral: Negative.        Compared to one year ago, the patient feels his physical health is the same.  Compared to one year ago, the patient feels his mental health is the same.    Objective     Physical Exam   Constitutional: He is oriented to person, place, and time. He appears well-developed and well-nourished. No distress.   Very pleasant man who appears his stated age, he is in no distress today   HENT:   Head: Normocephalic and atraumatic.   Right Ear: External ear normal.   Left Ear: External ear normal.   Mouth/Throat: Oropharynx is clear and moist. No oropharyngeal exudate.   Tympanic membranes normal   Eyes: Conjunctivae  "and EOM are normal. Pupils are equal, round, and reactive to light.   Neck: Normal range of motion. Neck supple. No thyromegaly present.   Cardiovascular: Normal rate, regular rhythm, normal heart sounds and intact distal pulses.   No murmur heard.  No carotid bruits   Pulmonary/Chest: Effort normal and breath sounds normal. No respiratory distress. He has no wheezes.   Abdominal: Soft. Bowel sounds are normal. He exhibits no distension. There is no tenderness.   Musculoskeletal: Normal range of motion. He exhibits no edema.   Right foot lateral aspect with approximate 1 cm bony nodule with no significant tenderness   Lymphadenopathy:     He has no cervical adenopathy.   Neurological: He is alert and oriented to person, place, and time. He displays normal reflexes. No cranial nerve deficit. Coordination normal.   Psychiatric: He has a normal mood and affect. His behavior is normal. Judgment and thought content normal.   Nursing note and vitals reviewed.    ECG 12 Lead  Date/Time: 5/3/2019 12:29 PM  Performed by: Vermeesch, Marilyn K, MD  Authorized by: Vermeesch, Marilyn K, MD   Comparison: not compared with previous ECG   Rhythm: sinus rhythm  Rate: normal  BPM: 70  Conduction: conduction normal  ST Segments: ST segments normal  T Waves: T waves normal  QRS axis: normal  Other findings: poor R wave progression    Clinical impression: normal ECG            Vitals:    05/03/19 0913   BP: 142/88   Pulse: 76   Temp: 98 °F (36.7 °C)   SpO2: 97%   Weight: 83.9 kg (185 lb)   Height: 180.3 cm (71\")   PainSc: 0-No pain       Patient's Body mass index is 25.8 kg/m². BMI is within normal parameters. No follow-up required..      Assessment/Plan   Patient Self-Management and Personalized Health Advice  The patient has been provided with information about: supplements and preventive services including:   · Diabetes screening, see lab orders, Pneumococcal vaccine , shingrix vaccine at pharmacy.    Visit Diagnoses:  No diagnosis " found.    No orders of the defined types were placed in this encounter.      Outpatient Encounter Medications as of 5/3/2019   Medication Sig Dispense Refill   • colchicine 0.6 MG capsule capsule Take 1 capsule by mouth Every 12 (Twelve) Hours. 180 capsule 1   • indomethacin (INDOCIN) 25 MG capsule Take 1 capsule by mouth 3 (Three) Times a Day With Meals. 270 capsule 1   • lisinopril (PRINIVIL,ZESTRIL) 20 MG tablet Take 1 tablet by mouth Daily. 200001 90 tablet 0   • tiZANidine (ZANAFLEX) 2 MG tablet Take 1 tablet by mouth 2 (Two) Times a Day As Needed for Muscle Spasms. 30 tablet 1     No facility-administered encounter medications on file as of 5/3/2019.        Reviewed use of high risk medication in the elderly: yes  Reviewed for potential of harmful drug interactions in the elderly: not applicable     Copy of living will for chart  Take vit D 1000 u a day  Take shingles vaccine at local pharmacy  Given prevnar vaccine today  Labs as noted  Colonoscopy due next yr with Dr Almeida  EKG done today  Monitor BP, elevated today    Follow Up:  No Follow-up on file.     An After Visit Summary and PPPS with all of these plans were given to the patient.

## 2019-05-29 LAB
25(OH)D3+25(OH)D2 SERPL-MCNC: 29.5 NG/ML
ALBUMIN SERPL-MCNC: 4.3 G/DL (ref 3.5–5)
ALBUMIN/GLOB SERPL: 1.6 G/DL (ref 1–2)
ALP SERPL-CCNC: 64 U/L (ref 38–126)
ALT SERPL-CCNC: 35 U/L (ref 13–69)
AST SERPL-CCNC: 30 U/L (ref 15–46)
BASOPHILS # BLD AUTO: 0.03 10*3/MM3 (ref 0–0.2)
BASOPHILS NFR BLD AUTO: 0.4 % (ref 0–1.5)
BILIRUB SERPL-MCNC: 0.9 MG/DL (ref 0.2–1.3)
BUN SERPL-MCNC: 16 MG/DL (ref 7–20)
BUN/CREAT SERPL: 16 (ref 6.3–21.9)
CALCIUM SERPL-MCNC: 9.7 MG/DL (ref 8.4–10.2)
CHLORIDE SERPL-SCNC: 101 MMOL/L (ref 98–107)
CHOLEST SERPL-MCNC: 141 MG/DL (ref 0–199)
CHOLEST/HDLC SERPL: 4.03 {RATIO}
CO2 SERPL-SCNC: 28 MMOL/L (ref 26–30)
CREAT SERPL-MCNC: 1 MG/DL (ref 0.6–1.3)
EOSINOPHIL # BLD AUTO: 0.17 10*3/MM3 (ref 0–0.4)
EOSINOPHIL NFR BLD AUTO: 2.4 % (ref 0.3–6.2)
ERYTHROCYTE [DISTWIDTH] IN BLOOD BY AUTOMATED COUNT: 13.2 % (ref 12.3–15.4)
GLOBULIN SER CALC-MCNC: 2.7 GM/DL
GLUCOSE SERPL-MCNC: 100 MG/DL (ref 74–98)
HCT VFR BLD AUTO: 46.1 % (ref 37.5–51)
HDLC SERPL-MCNC: 35 MG/DL (ref 40–60)
HGB BLD-MCNC: 15.6 G/DL (ref 13–17.7)
IMM GRANULOCYTES # BLD AUTO: 0.03 10*3/MM3 (ref 0–0.05)
IMM GRANULOCYTES NFR BLD AUTO: 0.4 % (ref 0–0.5)
LDLC SERPL CALC-MCNC: 88 MG/DL (ref 0–99)
LYMPHOCYTES # BLD AUTO: 1.76 10*3/MM3 (ref 0.7–3.1)
LYMPHOCYTES NFR BLD AUTO: 25.3 % (ref 19.6–45.3)
MCH RBC QN AUTO: 30.8 PG (ref 26.6–33)
MCHC RBC AUTO-ENTMCNC: 33.8 G/DL (ref 31.5–35.7)
MCV RBC AUTO: 91.1 FL (ref 79–97)
MONOCYTES # BLD AUTO: 0.45 10*3/MM3 (ref 0.1–0.9)
MONOCYTES NFR BLD AUTO: 6.5 % (ref 5–12)
NEUTROPHILS # BLD AUTO: 4.53 10*3/MM3 (ref 1.7–7)
NEUTROPHILS NFR BLD AUTO: 65 % (ref 42.7–76)
NRBC BLD AUTO-RTO: 0 /100 WBC (ref 0–0.2)
PLATELET # BLD AUTO: 216 10*3/MM3 (ref 140–450)
POTASSIUM SERPL-SCNC: 5 MMOL/L (ref 3.5–5.1)
PROT SERPL-MCNC: 7 G/DL (ref 6.3–8.2)
PSA SERPL-MCNC: 0.74 NG/ML (ref 0.06–4)
RBC # BLD AUTO: 5.06 10*6/MM3 (ref 4.14–5.8)
SODIUM SERPL-SCNC: 140 MMOL/L (ref 137–145)
TRIGL SERPL-MCNC: 90 MG/DL
VLDLC SERPL CALC-MCNC: 18 MG/DL
WBC # BLD AUTO: 6.97 10*3/MM3 (ref 3.4–10.8)

## 2020-01-16 ENCOUNTER — OFFICE VISIT (OUTPATIENT)
Dept: INTERNAL MEDICINE | Facility: CLINIC | Age: 65
End: 2020-01-16

## 2020-01-16 VITALS
HEART RATE: 78 BPM | WEIGHT: 181 LBS | RESPIRATION RATE: 15 BRPM | OXYGEN SATURATION: 99 % | HEIGHT: 71 IN | BODY MASS INDEX: 25.34 KG/M2 | DIASTOLIC BLOOD PRESSURE: 89 MMHG | TEMPERATURE: 97.8 F | SYSTOLIC BLOOD PRESSURE: 155 MMHG

## 2020-01-16 DIAGNOSIS — I10 ESSENTIAL HYPERTENSION: ICD-10-CM

## 2020-01-16 DIAGNOSIS — H93.13 TINNITUS OF BOTH EARS: Primary | ICD-10-CM

## 2020-01-16 DIAGNOSIS — J30.9 ALLERGIC RHINITIS, UNSPECIFIED SEASONALITY, UNSPECIFIED TRIGGER: ICD-10-CM

## 2020-01-16 PROCEDURE — 99214 OFFICE O/P EST MOD 30 MIN: CPT | Performed by: NURSE PRACTITIONER

## 2020-02-03 NOTE — PROGRESS NOTES
Chief Complaint / Reason:      Chief Complaint   Patient presents with   • Tinnitus       Subjective     HPI  Patient presents today with complaints of tinnitus he states that symptoms have been going on for quite some time but have worsened over the past few days and has been interfering with activities of daily living.  He and I discussed differential diagnosis associated with tinnitus and at this time he does not want to try any medication he would like to try home treatments and will contact the office if symptoms get worse.  He does have a history of environmental allergies. vital Signs are stable with exception of elevated blood pressure.  Denies any significant headache blurred vision numbness or tingling.  History taken from: patient    PMH/FH/Social History were reviewed and updated appropriately in the electronic medical record.   Past Medical History:   Diagnosis Date   • Allergy    • Atypical chest pain    • Fracture     h/o   • H/O chest x-ray 08/24/2015    Minimal left basilar scar and atelectasis. Otherwise chronic appearing findings. F/U PA and lateral views would be helpful for a more complete evaluation   • H/O renal calculi    • History of PFTs 09/15/2015    No obstruction or restrictions, normal diffusion   • Hypertension    • Malignant melanoma (CMS/HCC)     h/o   • S/P bronchoscopy 10/14/2015    Left lung was normal. Distortion found in anterior segment of RUL. An endochonchial lavahe was performed. Brushinhs were obtained. Examinamtion was doubtfully normal. Abnormal CT scan of chest   • Squamous cell carcinoma in situ of skin     h/o   • Tinea pedis    • Tinnitus      Past Surgical History:   Procedure Laterality Date   • BRONCHOSCOPY        Status post bronchoscopy 10/14/2015 with pathology and cytology negative for malignancy   • HAND SURGERY     • OTHER SURGICAL HISTORY      Biopsy of skin   • SKIN CANCER EXCISION       Social History     Socioeconomic History   • Marital status:       Spouse name: Not on file   • Number of children: Not on file   • Years of education: Not on file   • Highest education level: Not on file   Tobacco Use   • Smoking status: Former Smoker     Types: Cigarettes     Last attempt to quit:      Years since quittin.1   Substance and Sexual Activity   • Alcohol use: No   • Drug use: No     Family History   Problem Relation Age of Onset   • Cancer Mother         Colon   • Heart attack Father    • Cancer Other    • Heart attack Other        Review of Systems:   Review of Systems   Constitutional: Positive for fatigue.   HENT: Positive for tinnitus.    Respiratory: Negative.    Cardiovascular: Negative.    Allergic/Immunologic: Positive for environmental allergies.   Neurological: Negative.    Psychiatric/Behavioral: Negative.  Positive for stress.         All other systems were reviewed and are negative.  Exceptions are noted in the subjective or above.      Objective     Vital Signs  Vitals:    20 1412   BP: 155/89   Pulse: 78   Resp: 15   Temp: 97.8 °F (36.6 °C)   SpO2: 99%       Body mass index is 25.24 kg/m².    Physical Exam   Constitutional: He is oriented to person, place, and time. He appears well-developed and well-nourished. No distress.   HENT:   Head: Normocephalic and atraumatic.   Right Ear: External ear and ear canal normal. Tympanic membrane is erythematous and bulging. No decreased hearing is noted.   Left Ear: External ear and ear canal normal. Tympanic membrane is erythematous and bulging. No decreased hearing is noted.   Nose: Mucosal edema and rhinorrhea (clear nasal secretions) present. No sinus tenderness. Right sinus exhibits no maxillary sinus tenderness and no frontal sinus tenderness. Left sinus exhibits no maxillary sinus tenderness and no frontal sinus tenderness.   Mouth/Throat: Mucous membranes are dry. Posterior oropharyngeal erythema present.   PND   Eyes: Conjunctivae are normal.   Cardiovascular: Normal rate, regular  rhythm and normal heart sounds.   Pulmonary/Chest: Effort normal and breath sounds normal. No respiratory distress.   Lymphadenopathy:        Head (right side): No submental, no submandibular and no tonsillar adenopathy present.        Head (left side): No submental, no submandibular and no tonsillar adenopathy present.     He has no cervical adenopathy.   Neurological: He is alert and oriented to person, place, and time.   Skin: Skin is warm and dry. Capillary refill takes less than 2 seconds. No rash noted.   Psychiatric: He has a normal mood and affect. His behavior is normal. Judgment and thought content normal.   Nursing note and vitals reviewed.           Results Review:    I reviewed the patient's previous clinical results.       Medication Review:     Current Outpatient Medications:   •  indomethacin (INDOCIN) 25 MG capsule, Take 1 capsule by mouth 3 (Three) Times a Day With Meals., Disp: 270 capsule, Rfl: 1  •  lisinopril (PRINIVIL,ZESTRIL) 20 MG tablet, Take 1 tablet by mouth Daily. 200001, Disp: 90 tablet, Rfl: 3  •  tiZANidine (ZANAFLEX) 2 MG tablet, Take 1 tablet by mouth 2 (Two) Times a Day As Needed for Muscle Spasms., Disp: 60 tablet, Rfl: 1  •  colchicine 0.6 MG capsule capsule, Take 1 capsule by mouth Every 12 (Twelve) Hours., Disp: 180 capsule, Rfl: 1    Assessment/Plan   Umberto was seen today for tinnitus.    Diagnoses and all orders for this visit:    Tinnitus of both ears  Discussed medications that can increase tinnitus symptoms and discussed home care instructions with patient.  He stated understanding and would like to avoid any medications or invasive treatments at this time.  Denies referral also.  Recommend using white noise to alleviate symptoms and recommend stress management.  Allergic rhinitis, unspecified seasonality, unspecified trigger  Advised patient to take allergy medicine over-the-counter such as Claritin or Allegra.  Essential hypertension  Initiate lifestyle modifications.    DASH Diet and exercise.   Compliance with medication regimen and discussed ways to prevent of long-term complications from high blood pressure.  Discussed when to seek medical attention.  Encouraged patient to take blood pressure daily and keep a log.  Discussed association with elevated blood pressure and tinnitus with patient.  Recommend increasing lisinopril if blood pressure remains elevated    Return in about 4 weeks (around 2/13/2020), or if symptoms worsen or fail to improve, for Follow up with Dr. Vermeesch.    Katey Boyle, APRN  01/16/2020

## 2020-06-24 RX ORDER — LISINOPRIL 20 MG/1
20 TABLET ORAL DAILY
Qty: 90 TABLET | Refills: 3 | Status: SHIPPED | OUTPATIENT
Start: 2020-06-24 | End: 2021-09-07

## 2020-07-17 ENCOUNTER — OFFICE VISIT (OUTPATIENT)
Dept: INTERNAL MEDICINE | Facility: CLINIC | Age: 65
End: 2020-07-17

## 2020-07-17 VITALS
HEART RATE: 58 BPM | TEMPERATURE: 97.1 F | WEIGHT: 176 LBS | OXYGEN SATURATION: 98 % | BODY MASS INDEX: 24.64 KG/M2 | HEIGHT: 71 IN | DIASTOLIC BLOOD PRESSURE: 72 MMHG | SYSTOLIC BLOOD PRESSURE: 134 MMHG

## 2020-07-17 DIAGNOSIS — I10 ESSENTIAL HYPERTENSION: ICD-10-CM

## 2020-07-17 DIAGNOSIS — J30.9 ALLERGIC RHINITIS, UNSPECIFIED SEASONALITY, UNSPECIFIED TRIGGER: ICD-10-CM

## 2020-07-17 DIAGNOSIS — Z12.5 SCREENING PSA (PROSTATE SPECIFIC ANTIGEN): ICD-10-CM

## 2020-07-17 DIAGNOSIS — E55.9 VITAMIN D DEFICIENCY: ICD-10-CM

## 2020-07-17 DIAGNOSIS — Z00.00 MEDICARE ANNUAL WELLNESS VISIT, INITIAL: Primary | ICD-10-CM

## 2020-07-17 DIAGNOSIS — M10.072 IDIOPATHIC GOUT OF LEFT FOOT, UNSPECIFIED CHRONICITY: ICD-10-CM

## 2020-07-17 PROCEDURE — G0439 PPPS, SUBSEQ VISIT: HCPCS | Performed by: INTERNAL MEDICINE

## 2020-07-17 PROCEDURE — 99397 PER PM REEVAL EST PAT 65+ YR: CPT | Performed by: INTERNAL MEDICINE

## 2020-07-17 NOTE — PROGRESS NOTES
The ABCs of the Annual Wellness Visit  Subsequent Medicare Wellness Visit    Chief Complaint   Patient presents with   • Medicare Wellness-subsequent       Subjective   History of Present Illness:  Umberto Vigil is a 65 y.o. male who presents for a Subsequent Medicare Wellness Visit.  PMH of HTN, allergies, vitamin D deficiency, gout, history of kidney stones and history of melanoma.  His BP is OK today, runs about the same at home.  No CP, SOA, palpitations.  No recent gout flares.  His back as not been too bothersome.  He does not use much for back pain, occasional IBU before bed as needed.  No current anxiety or depression.      HEALTH RISK ASSESSMENT    Recent Hospitalizations:  No hospitalization(s) within the last year.    Current Medical Providers:  Patient Care Team:  Vermeesch, Marilyn K, MD as PCP - General  Vermeesch, Marilyn K, MD as PCP - Family Medicine    Smoking Status:  Social History     Tobacco Use   Smoking Status Former Smoker   • Types: Cigarettes   • Last attempt to quit:    • Years since quittin.5       Alcohol Consumption:  Social History     Substance and Sexual Activity   Alcohol Use No       Depression Screen:   PHQ-2/PHQ-9 Depression Screening 2020   Little interest or pleasure in doing things 0   Feeling down, depressed, or hopeless 0   Total Score 0       Fall Risk Screen:  STEADI Fall Risk Assessment was completed, and patient is at LOW risk for falls.Assessment completed on:2020    Health Habits and Functional and Cognitive Screening:  Functional & Cognitive Status 2020   Do you have difficulty preparing food and eating? No   Do you have difficulty bathing yourself, getting dressed or grooming yourself? No   Do you have difficulty using the toilet? No   Do you have difficulty moving around from place to place? No   Do you have trouble with steps or getting out of a bed or a chair? No   Current Diet Well Balanced Diet   Dental Exam Up to date   Eye Exam Up  to date   Exercise (times per week) 5 times per week   Current Exercise Activities Include -   Do you need help using the phone?  No   Are you deaf or do you have serious difficulty hearing?  No   Do you need help with transportation? No   Do you need help shopping? No   Do you need help preparing meals?  No   Do you need help with housework?  No   Do you need help with laundry? No   Do you need help taking your medications? No   Do you need help managing money? No   Do you ever drive or ride in a car without wearing a seat belt? No   Have you felt unusual stress, anger or loneliness in the last month? No   Who do you live with? Spouse   If you need help, do you have trouble finding someone available to you? No   Do you have difficulty concentrating, remembering or making decisions? No         Does the patient have evidence of cognitive impairment? No    Asprin use counseling:Does not need ASA (and currently is not on it)    Age-appropriate Screening Schedule:  Refer to the list below for future screening recommendations based on patient's age, sex and/or medical conditions. Orders for these recommended tests are listed in the plan section. The patient has been provided with a written plan.    Health Maintenance   Topic Date Due   • INFLUENZA VACCINE  08/01/2020   • COLONOSCOPY  09/30/2020   • TDAP/TD VACCINES (2 - Td) 10/15/2028   • ZOSTER VACCINE  Completed          The following portions of the patient's history were reviewed and updated as appropriate: allergies, current medications, past family history, past medical history, past social history, past surgical history and problem list.    Outpatient Medications Prior to Visit   Medication Sig Dispense Refill   • colchicine 0.6 MG capsule capsule Take 1 capsule by mouth Every 12 (Twelve) Hours. 180 capsule 1   • indomethacin (INDOCIN) 25 MG capsule Take 1 capsule by mouth 3 (Three) Times a Day With Meals. 270 capsule 1   • lisinopril (PRINIVIL,ZESTRIL) 20 MG  "tablet Take 1 tablet by mouth Daily. 200001 90 tablet 3   • tiZANidine (ZANAFLEX) 2 MG tablet Take 1 tablet by mouth 2 (Two) Times a Day As Needed for Muscle Spasms. 60 tablet 1     No facility-administered medications prior to visit.        Patient Active Problem List   Diagnosis   • Vitamin D deficiency   • Hypertension   • Gout   • Allergic rhinitis   • Acute midline low back pain without sciatica   • Medicare annual wellness visit, initial   • Screening PSA (prostate specific antigen)       Advanced Care Planning:  ACP discussion was held with the patient during this visit. Patient has an advance directive (not in EMR), copy requested.    Review of Systems   Constitutional: Negative.    HENT: Positive for tinnitus.    Eyes: Negative.    Respiratory: Negative.    Cardiovascular: Negative.    Gastrointestinal: Negative.    Genitourinary: Negative.    Musculoskeletal: Positive for back pain.   Skin: Negative.    Allergic/Immunologic: Positive for environmental allergies.   Neurological: Negative.    Hematological: Negative.    Psychiatric/Behavioral: Negative.        Compared to one year ago, the patient feels his physical health is the same.  Compared to one year ago, the patient feels his mental health is the same.    Reviewed chart for potential of high risk medication in the elderly: yes  Reviewed chart for potential of harmful drug interactions in the elderly:yes    Objective         Vitals:    07/17/20 1546   BP: 134/72   Pulse: 58   Temp: 97.1 °F (36.2 °C)   SpO2: 98%   Weight: 79.8 kg (176 lb)   Height: 180.3 cm (71\")   PainSc:   2       Body mass index is 24.55 kg/m².  Discussed the patient's BMI with him. The BMI is in the acceptable range.    Physical Exam   Constitutional: He is oriented to person, place, and time. He appears well-developed and well-nourished. No distress.   Very kind and pleasant man, he is wearing a mask and in no distress today   HENT:   Head: Normocephalic and atraumatic.   Right " Ear: External ear normal.   Left Ear: External ear normal.   TMs are normal   Eyes: Pupils are equal, round, and reactive to light. Conjunctivae and EOM are normal. Right eye exhibits no discharge. Left eye exhibits no discharge.   Neck: Normal range of motion. Neck supple. No thyromegaly present.   Cardiovascular: Normal rate, regular rhythm, normal heart sounds and intact distal pulses.   No murmur heard.  No carotid bruits   Pulmonary/Chest: Effort normal and breath sounds normal. No respiratory distress. He has no wheezes.   Abdominal: Soft. Bowel sounds are normal. He exhibits no distension. There is no tenderness.   Genitourinary:   Genitourinary Comments: Patient declines rectal exam   Musculoskeletal: Normal range of motion. He exhibits no edema.   Lymphadenopathy:     He has no cervical adenopathy.   Neurological: He is alert and oriented to person, place, and time. No cranial nerve deficit or sensory deficit. He exhibits normal muscle tone. Coordination normal.   Skin:   Feet with no callus or ulcer   Psychiatric: He has a normal mood and affect. His behavior is normal. Judgment and thought content normal.   Nursing note and vitals reviewed.            Assessment/Plan   Medicare Risks and Personalized Health Plan  CMS Preventative Services Quick Reference  Cardiovascular risk    The above risks/problems have been discussed with the patient.  Pertinent information has been shared with the patient in the After Visit Summary.  Follow up plans and orders are seen below in the Assessment/Plan Section.    Diagnoses and all orders for this visit:    1. Medicare annual wellness visit, initial (Primary)  -     CBC & Differential  -     Comprehensive Metabolic Panel  -     Lipid Panel With / Chol / HDL Ratio  -     PSA Screen    2. Essential hypertension  -     CBC & Differential  -     Comprehensive Metabolic Panel  -     Lipid Panel With / Chol / HDL Ratio    3. Idiopathic gout of left foot, unspecified  chronicity    4. Screening PSA (prostate specific antigen)  -     PSA Screen    5. Vitamin D deficiency    6. Allergic rhinitis, unspecified seasonality, unspecified trigger    labs as noted  He has a colonoscopy scheduled with Dr Almeida at [facility] in September  He will make an appt with derm for his annual follow up due to history of melanoma  Follow heart healthy/low salt diet  Avoid processed foods  Monitor blood pressure on occasion  Exercise as tolerated up to 30 minutes 5 days per week  Take all medications as prescribed  No recent flare of gout, patient takes Indocin and colchicine as needed when this occurs  Continue tizanidine as needed for back pain as needed  Encourage daily vitamin D  Allergies are not currently bothersome    Follow Up:  Return in about 6 months (around 1/17/2021) for Next scheduled follow up.     An After Visit Summary and PPPS were given to the patient.

## 2020-08-20 DIAGNOSIS — R73.9 ELEVATED BLOOD SUGAR: Primary | ICD-10-CM

## 2020-08-20 LAB
ALBUMIN SERPL-MCNC: 4.6 G/DL (ref 3.5–5.2)
ALBUMIN/GLOB SERPL: 2.7 G/DL
ALP SERPL-CCNC: 72 U/L (ref 39–117)
ALT SERPL-CCNC: 16 U/L (ref 1–41)
AST SERPL-CCNC: 18 U/L (ref 1–40)
BASOPHILS # BLD AUTO: 0.03 10*3/MM3 (ref 0–0.2)
BASOPHILS NFR BLD AUTO: 0.4 % (ref 0–1.5)
BILIRUB SERPL-MCNC: 0.5 MG/DL (ref 0–1.2)
BUN SERPL-MCNC: 17 MG/DL (ref 8–23)
BUN/CREAT SERPL: 16.2 (ref 7–25)
CALCIUM SERPL-MCNC: 9.5 MG/DL (ref 8.6–10.5)
CHLORIDE SERPL-SCNC: 99 MMOL/L (ref 98–107)
CHOLEST SERPL-MCNC: 148 MG/DL (ref 0–200)
CHOLEST/HDLC SERPL: 4 {RATIO}
CO2 SERPL-SCNC: 30.1 MMOL/L (ref 22–29)
CREAT SERPL-MCNC: 1.05 MG/DL (ref 0.76–1.27)
EOSINOPHIL # BLD AUTO: 0.22 10*3/MM3 (ref 0–0.4)
EOSINOPHIL NFR BLD AUTO: 2.8 % (ref 0.3–6.2)
ERYTHROCYTE [DISTWIDTH] IN BLOOD BY AUTOMATED COUNT: 12.9 % (ref 12.3–15.4)
GLOBULIN SER CALC-MCNC: 1.7 GM/DL
GLUCOSE SERPL-MCNC: 115 MG/DL (ref 65–99)
HBA1C MFR BLD: 5.5 % (ref 4.8–5.6)
HCT VFR BLD AUTO: 43.8 % (ref 37.5–51)
HDLC SERPL-MCNC: 37 MG/DL (ref 40–60)
HGB BLD-MCNC: 15.4 G/DL (ref 13–17.7)
IMM GRANULOCYTES # BLD AUTO: 0.02 10*3/MM3 (ref 0–0.05)
IMM GRANULOCYTES NFR BLD AUTO: 0.3 % (ref 0–0.5)
LDLC SERPL CALC-MCNC: 90 MG/DL (ref 0–100)
LYMPHOCYTES # BLD AUTO: 1.76 10*3/MM3 (ref 0.7–3.1)
LYMPHOCYTES NFR BLD AUTO: 22.5 % (ref 19.6–45.3)
Lab: NORMAL
MCH RBC QN AUTO: 30.4 PG (ref 26.6–33)
MCHC RBC AUTO-ENTMCNC: 35.2 G/DL (ref 31.5–35.7)
MCV RBC AUTO: 86.4 FL (ref 79–97)
MONOCYTES # BLD AUTO: 0.5 10*3/MM3 (ref 0.1–0.9)
MONOCYTES NFR BLD AUTO: 6.4 % (ref 5–12)
NEUTROPHILS # BLD AUTO: 5.28 10*3/MM3 (ref 1.7–7)
NEUTROPHILS NFR BLD AUTO: 67.6 % (ref 42.7–76)
NRBC BLD AUTO-RTO: 0 /100 WBC (ref 0–0.2)
PLATELET # BLD AUTO: 212 10*3/MM3 (ref 140–450)
POTASSIUM SERPL-SCNC: 4.7 MMOL/L (ref 3.5–5.2)
PROT SERPL-MCNC: 6.3 G/DL (ref 6–8.5)
PSA SERPL-MCNC: 0.77 NG/ML (ref 0–4)
RBC # BLD AUTO: 5.07 10*6/MM3 (ref 4.14–5.8)
SODIUM SERPL-SCNC: 139 MMOL/L (ref 136–145)
TRIGL SERPL-MCNC: 103 MG/DL (ref 0–150)
VLDLC SERPL CALC-MCNC: 20.6 MG/DL
WBC # BLD AUTO: 7.81 10*3/MM3 (ref 3.4–10.8)
WRITTEN AUTHORIZATION: NORMAL

## 2020-08-20 NOTE — PROGRESS NOTES
Please tell patient his A1c is 5.5 suggesting average blood sugar of 105.  Please tell him to continue a healthy diet with avoidance of excessive amounts of sweets.

## 2020-09-01 ENCOUNTER — HOSPITAL ENCOUNTER (OUTPATIENT)
Dept: GENERAL RADIOLOGY | Facility: HOSPITAL | Age: 65
Discharge: HOME | End: 2020-09-01
Admitting: INTERNAL MEDICINE

## 2020-09-01 ENCOUNTER — OFFICE VISIT (OUTPATIENT)
Dept: INTERNAL MEDICINE | Facility: CLINIC | Age: 65
End: 2020-09-01

## 2020-09-01 VITALS
HEART RATE: 67 BPM | TEMPERATURE: 97.1 F | WEIGHT: 178 LBS | BODY MASS INDEX: 24.92 KG/M2 | DIASTOLIC BLOOD PRESSURE: 82 MMHG | SYSTOLIC BLOOD PRESSURE: 130 MMHG | HEIGHT: 71 IN | OXYGEN SATURATION: 98 %

## 2020-09-01 DIAGNOSIS — M25.561 ACUTE PAIN OF RIGHT KNEE: ICD-10-CM

## 2020-09-01 DIAGNOSIS — M25.552 LEFT HIP PAIN: ICD-10-CM

## 2020-09-01 DIAGNOSIS — M25.552 LEFT HIP PAIN: Primary | ICD-10-CM

## 2020-09-01 PROCEDURE — 73502 X-RAY EXAM HIP UNI 2-3 VIEWS: CPT

## 2020-09-01 PROCEDURE — G0008 ADMIN INFLUENZA VIRUS VAC: HCPCS | Performed by: INTERNAL MEDICINE

## 2020-09-01 PROCEDURE — 90653 IIV ADJUVANT VACCINE IM: CPT | Performed by: INTERNAL MEDICINE

## 2020-09-01 PROCEDURE — 73562 X-RAY EXAM OF KNEE 3: CPT

## 2020-09-01 PROCEDURE — 99213 OFFICE O/P EST LOW 20 MIN: CPT | Performed by: INTERNAL MEDICINE

## 2020-09-01 RX ORDER — MELOXICAM 7.5 MG/1
7.5 TABLET ORAL DAILY
Qty: 90 TABLET | Refills: 1 | Status: SHIPPED | OUTPATIENT
Start: 2020-09-01 | End: 2020-09-03 | Stop reason: SDUPTHER

## 2020-09-01 NOTE — PROGRESS NOTES
Please tell patient that x-ray of hip reveals arthritis changes in hip as well as in his posterior sacroiliac joint.  There is no bone spur mentioned though.  We will see if Mobic/meloxicam is helpful for this pain.

## 2020-09-01 NOTE — PROGRESS NOTES
"Chief Complaint   Patient presents with   • Abstract     Pt states he's had right knee pain off and on for a while now, seems to be getting worse.     Subjective   Umberto Vigil is a 65 y.o. male.     Patient is here today with complaints of right knee pain.  He states the pain is getting worse over past 2 months.  He has not noted any swelling, redness or warmth.  He has not had any injury or overexertion.   He has taken  mg TID for several weeks and it helps pain but the knee is getting worse.  The knee bothers him day and night.    He injured the knee in [employer] playing [employer].      He also has some pain in left hip and low back.  This is worse in the middle of the night when he rolls a certain way.  The hip is worse when he bends over and tries to stand back up.  The more he does the worse it gets.        The following portions of the patient's history were reviewed and updated as appropriate: allergies, current medications, past family history, past medical history, past social history, past surgical history and problem list.    Review of Systems   Constitutional: Positive for activity change. Negative for appetite change, chills and fever.   Musculoskeletal: Positive for arthralgias, back pain and joint swelling.        Left hip pain and right knee pain   Skin: Negative.        Objective   /82   Pulse 67   Temp 97.1 °F (36.2 °C)   Ht 180.3 cm (71\")   Wt 80.7 kg (178 lb)   SpO2 98%   BMI 24.83 kg/m²   Body mass index is 24.83 kg/m².  Physical Exam   Constitutional: He is oriented to person, place, and time. He appears well-developed and well-nourished. No distress.   Very kind and pleasant man, he appears his age and is in no distress today   HENT:   Head: Normocephalic and atraumatic.   Right Ear: External ear normal.   Left Ear: External ear normal.   Eyes: Pupils are equal, round, and reactive to light. EOM are normal.   Neck: Normal range of motion. Neck supple. "   Pulmonary/Chest: Effort normal. No respiratory distress.   Musculoskeletal:   Right knee with small prepatellar effusion noted, full range of motion with no pain, mild pain inferior and medial to patella, no ballottement, negative drawer and Lockman sign  Left hip with no point tenderness, full range of motion with no pain on testing, no pain with pelvic tilt, mild pain over sacroiliac joint to palpation  Muscle strength 5/5 in lower extremities bilaterally   Neurological: He is alert and oriented to person, place, and time. He displays normal reflexes. No cranial nerve deficit or sensory deficit. He exhibits normal muscle tone. Coordination normal.   Skin: No rash noted.   Psychiatric: He has a normal mood and affect. His behavior is normal. Judgment and thought content normal.   Nursing note and vitals reviewed.      Assessment/Plan   Umberto A Yaritza is here today and the following problems have been addressed:      Umberto was seen today for abstract.    Diagnoses and all orders for this visit:    Left hip pain  -     XR Hip With or Without Pelvis 2 - 3 View Left; Future    Acute pain of right knee  -     XR knee 3 vw right; Future    Other orders  -     meloxicam (MOBIC) 7.5 MG tablet; Take 1 tablet by mouth Daily.  -     Fluad Quad 65+ yrs ([id number])    XR of left hip and right knee today-will contact patient with results and further plan of care  Start mobic 7.5 mg daily with food  Recommend knee sleeve on in AM and off in PM  He would like to see Dr Mendez if needed  Flu shot today      Return to clinic in 4 months for routine follow-up or as needed      Please note that portions of this note were completed with a voice recognition program.  Efforts were made to edit dictation, but occasionally words are mistranscribed.  Answers for HPI/ROS submitted by the patient on 8/28/2020   What is the primary reason for your visit?: Other  Please describe your symptoms.: I have right knee paid which has gradually  increased to the point I believe I need to have it looked at to see if I need treatment. I also have persistent pain in left hip or lower back I would like to discuss. Thank you.  Have you had these symptoms before?: Yes  How long have you been having these symptoms?: Greater than 2 weeks  Please list any medications you are currently taking for this condition.: Ibuprofen.  Please describe any probable cause for these symptoms. : I believe physical activity but the knee has gotten significantly worse than normal.

## 2020-09-03 DIAGNOSIS — M25.561 ACUTE PAIN OF RIGHT KNEE: ICD-10-CM

## 2020-09-03 DIAGNOSIS — M25.552 LEFT HIP PAIN: Primary | ICD-10-CM

## 2020-09-03 RX ORDER — MELOXICAM 7.5 MG/1
7.5 TABLET ORAL DAILY
Qty: 90 TABLET | Refills: 1 | Status: SHIPPED | OUTPATIENT
Start: 2020-09-03 | End: 2021-10-11

## 2020-09-03 NOTE — PROGRESS NOTES
Please tell patient he has significant arthritis in his knee with narrowing over the mid knee as well as some bone spurring.  I believe that he would benefit from seeing a orthopedic surgeon.  If he is agreeable I will make referral unless he would like to try physical therapy first.  Physical therapy may be helpful for both his hip and knee.

## 2020-09-25 ENCOUNTER — TELEPHONE (OUTPATIENT)
Dept: INTERNAL MEDICINE | Facility: CLINIC | Age: 65
End: 2020-09-25

## 2020-09-25 NOTE — TELEPHONE ENCOUNTER
PATIENT IS REQUESTING A COPY OF AN XRAY FROM HIS RIGHT KNEE  HE HAS AN APPOINTMENT Monday AT 1:00 WITH DR [facility]EN[facility]   THEY WOULD LIKE TO PICK THIS UP IN THE OFFICE ON Monday  STATED IT WAS DONE DOWNSTAIRS IN THE LAB ON FIRST FLOOR  AND NEEDS TO BE ON A CD    PATIENT CALL BACK [phone]

## 2021-01-18 ENCOUNTER — IMMUNIZATION (OUTPATIENT)
Dept: VACCINE CLINIC | Facility: HOSPITAL | Age: 66
End: 2021-01-18

## 2021-01-18 PROCEDURE — 91300 HC SARSCOV02 VAC 30MCG/0.3ML IM: CPT | Performed by: FAMILY MEDICINE

## 2021-01-18 PROCEDURE — 0001A: CPT | Performed by: FAMILY MEDICINE

## 2021-02-08 ENCOUNTER — IMMUNIZATION (OUTPATIENT)
Dept: VACCINE CLINIC | Facility: HOSPITAL | Age: 66
End: 2021-02-08

## 2021-02-08 PROCEDURE — 0002A: CPT | Performed by: INTERNAL MEDICINE

## 2021-02-08 PROCEDURE — 91300 HC SARSCOV02 VAC 30MCG/0.3ML IM: CPT | Performed by: INTERNAL MEDICINE

## 2021-09-07 RX ORDER — LISINOPRIL 20 MG/1
TABLET ORAL
Qty: 90 TABLET | Refills: 0 | Status: SHIPPED | OUTPATIENT
Start: 2021-09-07 | End: 2021-11-19 | Stop reason: SDUPTHER

## 2021-10-11 ENCOUNTER — TELEMEDICINE (OUTPATIENT)
Dept: INTERNAL MEDICINE | Facility: CLINIC | Age: 66
End: 2021-10-11

## 2021-10-11 VITALS — TEMPERATURE: 99.6 F

## 2021-10-11 DIAGNOSIS — J01.00 SUBACUTE MAXILLARY SINUSITIS: Primary | ICD-10-CM

## 2021-10-11 PROCEDURE — 99213 OFFICE O/P EST LOW 20 MIN: CPT | Performed by: INTERNAL MEDICINE

## 2021-10-11 RX ORDER — AMOXICILLIN 875 MG/1
875 TABLET, COATED ORAL EVERY 12 HOURS SCHEDULED
Qty: 20 TABLET | Refills: 0 | Status: SHIPPED | OUTPATIENT
Start: 2021-10-11 | End: 2021-11-18

## 2021-10-11 NOTE — PROGRESS NOTES
Chief Complaint   Patient presents with   • Abstract     Pt states he's had drainage, cough, congestion, chest congestion, and wakes up every morning with sore throat X 12 days. Pt states he's been taking sudafed every morning, this helps with the drainage but that's it.     Subjective   Umberto ISH Vigil is a 66 y.o. male.     Telemedicine visit with patient today from his home.  He has a ST in AM that resolves after sudafed.  His wife did have URI sxs last week and is better.  He did have COVID vaccine.      Cough  This is a new problem. The current episode started 1 to 4 weeks ago. The problem has been waxing and waning. The problem occurs every few hours. The cough is productive of sputum. Associated symptoms include a fever, headaches, nasal congestion, postnasal drip, rhinorrhea and a sore throat. Pertinent negatives include no chills, ear congestion, ear pain, shortness of breath or wheezing. The symptoms are aggravated by lying down. The treatment provided mild relief.        The following portions of the patient's history were reviewed and updated as appropriate: allergies, current medications, past family history, past medical history, past social history, past surgical history and problem list.    Review of Systems   Constitutional: Positive for fever. Negative for chills.   HENT: Positive for postnasal drip, rhinorrhea, sinus pressure, sinus pain and sore throat. Negative for ear pain.    Respiratory: Positive for cough. Negative for shortness of breath and wheezing.    Cardiovascular: Negative.    Gastrointestinal: Negative.    Neurological: Positive for headaches.       Objective   Temp 99.6 °F (37.6 °C)   There is no height or weight on file to calculate BMI.  Physical Exam  Vitals and nursing note reviewed.   Constitutional:       General: He is not in acute distress.     Appearance: Normal appearance. He is not ill-appearing.      Comments: Kind and pleasant man, appears his age and in no obvious  distress today   HENT:      Head: Normocephalic and atraumatic.      Right Ear: External ear normal.      Left Ear: External ear normal.      Nose:      Comments: Left maxillary sinus tenderness to palpation per patient directed exam  Eyes:      General:         Right eye: No discharge.         Left eye: No discharge.      Extraocular Movements: Extraocular movements intact.   Neck:      Comments: No cervical adenopathy or tenderness per patient directed exam  Pulmonary:      Effort: Pulmonary effort is normal. No respiratory distress.      Comments: Patient speaks in full sentences throughout interview today, no cough or respiratory distress noted  Lymphadenopathy:      Cervical: No cervical adenopathy.   Neurological:      Mental Status: He is alert and oriented to person, place, and time.      Cranial Nerves: No cranial nerve deficit.   Psychiatric:         Mood and Affect: Mood normal.         Behavior: Behavior normal.         Thought Content: Thought content normal.         Judgment: Judgment normal.         Assessment/Plan   Umberto Vigil is here today and the following problems have been addressed:      Diagnoses and all orders for this visit:    1. Subacute maxillary sinusitis (Primary)    Other orders  -     amoxicillin (AMOXIL) 875 MG tablet; Take 1 tablet by mouth Every 12 (Twelve) Hours.  Dispense: 20 tablet; Refill: 0    Patient and wife had similar symptoms over past few weeks  Wife has improved after treatment with antibiotics  His symptoms improve after 1 dose of Sudafed in early morning and then worsen later in the evening and while sleeping at night due to postnasal drip  Will treat with amoxicillin 875 mg twice daily for 10 days  Increase fluids and rest    Return to clinic if symptoms worsen or persist    Time devoted to visit: 12 minutes including time to review previous record, with 75% of time devoted to direct discussion    Patient presents during COVID-19 pandemic/federally declared  state of public health emergency.  This service was conducted via telemedicine video visit via [employer] patient was seen at home due to cough and increased risk of transmission of infection to others.      Please note that portions of this note were completed with a voice recognition program.  Efforts were made to edit dictation, but occasionally words are mistranscribed.You have chosen to receive care through a telehealth visit.  Do you consent to use a video connection for your medical care today? Yes  Active Parties: Marilyn Vermeesch (PCP), Karen Pittman (CMA), and (Pt).  Video visit done via [email or web address], Pt on a computer and Dr. Vermeesch on cell phone.

## 2021-11-18 ENCOUNTER — PRE-ADMISSION TESTING (OUTPATIENT)
Dept: PREADMISSION TESTING | Facility: HOSPITAL | Age: 66
End: 2021-11-18

## 2021-11-18 VITALS — HEIGHT: 71 IN | WEIGHT: 181.4 LBS | BODY MASS INDEX: 25.4 KG/M2

## 2021-11-18 LAB
ANION GAP SERPL CALCULATED.3IONS-SCNC: 9 MMOL/L (ref 5–15)
BUN SERPL-MCNC: 24 MG/DL (ref 8–23)
BUN/CREAT SERPL: 23.5 (ref 7–25)
CALCIUM SPEC-SCNC: 9.8 MG/DL (ref 8.6–10.5)
CHLORIDE SERPL-SCNC: 103 MMOL/L (ref 98–107)
CO2 SERPL-SCNC: 28 MMOL/L (ref 22–29)
CREAT SERPL-MCNC: 1.02 MG/DL (ref 0.76–1.27)
DEPRECATED RDW RBC AUTO: 42 FL (ref 37–54)
ERYTHROCYTE [DISTWIDTH] IN BLOOD BY AUTOMATED COUNT: 12.5 % (ref 12.3–15.4)
GFR SERPL CREATININE-BSD FRML MDRD: 73 ML/MIN/1.73
GLUCOSE SERPL-MCNC: 95 MG/DL (ref 65–99)
HBA1C MFR BLD: 5.6 % (ref 4.8–5.6)
HCT VFR BLD AUTO: 46.1 % (ref 37.5–51)
HGB BLD-MCNC: 15.7 G/DL (ref 13–17.7)
MCH RBC QN AUTO: 31.3 PG (ref 26.6–33)
MCHC RBC AUTO-ENTMCNC: 34.1 G/DL (ref 31.5–35.7)
MCV RBC AUTO: 92 FL (ref 79–97)
PLATELET # BLD AUTO: 224 10*3/MM3 (ref 140–450)
PMV BLD AUTO: 9.4 FL (ref 6–12)
POTASSIUM SERPL-SCNC: 4.6 MMOL/L (ref 3.5–5.2)
QT INTERVAL: 378 MS
QTC INTERVAL: 410 MS
RBC # BLD AUTO: 5.01 10*6/MM3 (ref 4.14–5.8)
SODIUM SERPL-SCNC: 140 MMOL/L (ref 136–145)
WBC NRBC COR # BLD: 9.13 10*3/MM3 (ref 3.4–10.8)

## 2021-11-18 PROCEDURE — 85027 COMPLETE CBC AUTOMATED: CPT

## 2021-11-18 PROCEDURE — 80048 BASIC METABOLIC PNL TOTAL CA: CPT

## 2021-11-18 PROCEDURE — 93010 ELECTROCARDIOGRAM REPORT: CPT | Performed by: INTERNAL MEDICINE

## 2021-11-18 PROCEDURE — 83036 HEMOGLOBIN GLYCOSYLATED A1C: CPT

## 2021-11-18 PROCEDURE — 36415 COLL VENOUS BLD VENIPUNCTURE: CPT

## 2021-11-18 PROCEDURE — 93005 ELECTROCARDIOGRAM TRACING: CPT

## 2021-11-18 RX ORDER — INDOMETHACIN 20 MG/1
CAPSULE ORAL
COMMUNITY
End: 2021-12-03 | Stop reason: HOSPADM

## 2021-11-18 ASSESSMENT — KOOS JR
KOOS JR SCORE: 9
KOOS JR SCORE: 63.776

## 2021-11-18 NOTE — PAT
Patient viewed general PAT education video as instructed in their preoperative information received from their surgeon.  Patient stated the general PAT education video was viewed in its entirety and survey completed.  Copies of PAT general education handouts (Incentive Spirometry, Meds to Beds Program, Patient Belongings, Pre-op skin preparation instructions, Blood Glucose testing, Visitor policy, Surgery FAQ, Code H) distributed to patient if not printed. Education related to the PAT pass and skin preparation for surgery (if applicable) completed in PAT as a reinforcement to PAT education video. Patient instructed to return PAT pass provided today as well as completed skin preparation sheet (if applicable) on the day of procedure.     Additionally if patient had not viewed video yet but intended to view it at home or in our waiting area, then referred them to the handout with QR code/link provided during PAT visit.  Instructed patient to complete survey after viewing the video in its entirety.  Encouraged patient/family to read [facility] general education handouts thoroughly and notify PAT staff with any questions or concerns. Patient verbalized understanding of all information and priority content.    Discussed with patient options for receiving total joint replacement education and assessed patient's ability and preference. Joint Replacement Guide given to patient during PAT visit since not received a copy within the last year. Encouraged patient/family to read guide thoroughly and notify PAT staff with any questions or concerns. Handout provided directing patient to links to watch online videos related to joint replacement surgery on the [facility] website. The handout gives detailed instructions for joining an online joint replacement class through Zoom or phone conference offered on Thursdays. Patient agreed to participate by watching videos online. Patient verbalized understanding of instructions and to  complete the online learning tool survey. Encouraged to share information with family and/or . An overview of the joint replacement education was provided during the visit including general perioperative instructions that are routine for all surgical patients (PAT PASS, wipes, directions to pre-op, etc.).    Patient instructed to drink 20 ounces (or until full) of Gatorade and it needs to be completed 1 hour (for Main OR patients) or 2 hours (scheduled  section patients) before given arrival time for procedure (NO RED Gatorade)    Patient verbalized understanding.'  Patient to apply Chlorhexadine wipes  to surgical area (as instructed) the night before procedure and the AM of procedure. Wipes provided.      Covid test on 21.

## 2021-11-19 ENCOUNTER — OFFICE VISIT (OUTPATIENT)
Dept: INTERNAL MEDICINE | Facility: CLINIC | Age: 66
End: 2021-11-19

## 2021-11-19 VITALS
TEMPERATURE: 97 F | DIASTOLIC BLOOD PRESSURE: 70 MMHG | HEART RATE: 74 BPM | HEIGHT: 71 IN | WEIGHT: 181 LBS | SYSTOLIC BLOOD PRESSURE: 128 MMHG | OXYGEN SATURATION: 97 % | BODY MASS INDEX: 25.34 KG/M2

## 2021-11-19 DIAGNOSIS — L30.9 DERMATITIS: Primary | ICD-10-CM

## 2021-11-19 PROBLEM — M54.50 ACUTE MIDLINE LOW BACK PAIN WITHOUT SCIATICA: Status: RESOLVED | Noted: 2019-04-15 | Resolved: 2021-11-19

## 2021-11-19 PROBLEM — M25.561 ACUTE PAIN OF RIGHT KNEE: Status: RESOLVED | Noted: 2020-09-01 | Resolved: 2021-11-19

## 2021-11-19 PROCEDURE — 99213 OFFICE O/P EST LOW 20 MIN: CPT | Performed by: INTERNAL MEDICINE

## 2021-11-19 RX ORDER — LISINOPRIL 20 MG/1
20 TABLET ORAL DAILY
Qty: 90 TABLET | Refills: 3 | Status: SHIPPED | OUTPATIENT
Start: 2021-11-19 | End: 2022-12-07 | Stop reason: SDUPTHER

## 2021-11-19 RX ORDER — METHYLPREDNISOLONE 4 MG/1
TABLET ORAL
Qty: 1 EACH | Refills: 0 | Status: SHIPPED | OUTPATIENT
Start: 2021-11-19 | End: 2021-12-03 | Stop reason: HOSPADM

## 2021-11-19 NOTE — PROGRESS NOTES
"Chief Complaint   Patient presents with   • Abstract     Pt states he's had a itchy rash on chest for about 2 weeks.      Subjective   Umberto Anuel Vigil is a 66 y.o. male.     Here today with itchy rash on chest for past few weeks.   The rash is over upper chest and mid arms.  Patient states he had itching of upper chest prior to rash developing for several days to a week.  Area is itchy.  He has tried HC 1%, used it only once and again later, seemed to make rash worse.   Also used lubriderm a few times a day for a few days and it did not help.   He cannot remember any new laundry detergent, soaps or dryer sheets.    No one else has rash.  Had COVID booster in August.    No new herbs or supplements.   He took one indocin after rash started as he felt he was getting gout flare, but rash was already present.        The following portions of the patient's history were reviewed and updated as appropriate: allergies, current medications, past family history, past medical history, past social history, past surgical history and problem list.    Review of Systems   Constitutional: Negative for chills and fever.   Musculoskeletal: Negative for arthralgias.   Skin: Positive for rash.       Objective   /70   Pulse 74   Temp 97 °F (36.1 °C)   Ht 180.3 cm (71\")   Wt 82.1 kg (181 lb)   SpO2 97%   BMI 25.24 kg/m²   Body mass index is 25.24 kg/m².  Physical Exam  Vitals and nursing note reviewed.   Constitutional:       General: He is not in acute distress.     Appearance: Normal appearance. He is not ill-appearing.      Comments: Pleasant man, appears his age and in no distress   HENT:      Head: Atraumatic.      Right Ear: External ear normal.      Left Ear: External ear normal.   Eyes:      General:         Right eye: No discharge.         Left eye: No discharge.      Extraocular Movements: Extraocular movements intact.   Pulmonary:      Effort: Pulmonary effort is normal. No respiratory distress.   Skin:     " Findings: Rash present.      Comments: Upper chest with erythematous macular papular rash in U-shaped distribution extending up to neck, upper arms with few red papular lesions   Neurological:      General: No focal deficit present.      Mental Status: He is alert and oriented to person, place, and time. Mental status is at baseline.      Cranial Nerves: No cranial nerve deficit.      Motor: No weakness.      Gait: Gait normal.   Psychiatric:         Mood and Affect: Mood normal.         Behavior: Behavior normal.         Thought Content: Thought content normal.         Judgment: Judgment normal.         Assessment/Plan   Umberto Vigil is here today and the following problems have been addressed:      Diagnoses and all orders for this visit:    1. Dermatitis (Primary)    Other orders  -     methylPREDNISolone (MEDROL) 4 MG dose pack; Take as directed on package instructions.  Dispense: 1 each; Refill: 0  -     lisinopril (PRINIVIL,ZESTRIL) 20 MG tablet; Take 1 tablet by mouth Daily. as directed  Dispense: 90 tablet; Refill: 3    Nonspecific dermatitis, question whether or not this is related to Covid booster vaccine as it occurred approximately 2 to 3 weeks later  Given Medrol Dosepak to take as directed  Also recommend that he mix Cetaphil moisturizing cream with hydrocortisone 1%, 50-50 mix and apply to rash twice daily until resolved    Return to clinic as needed if symptoms worsen or persist for further evaluation    Please note that portions of this note were completed with a voice recognition program.  Efforts were made to edit dictation, but occasionally words are mistranscribed.

## 2021-11-30 ENCOUNTER — APPOINTMENT (OUTPATIENT)
Dept: PREADMISSION TESTING | Facility: HOSPITAL | Age: 66
End: 2021-11-30

## 2021-11-30 LAB — SARS-COV-2 RNA PNL SPEC NAA+PROBE: NOT DETECTED

## 2021-11-30 PROCEDURE — U0004 COV-19 TEST NON-CDC HGH THRU: HCPCS | Performed by: ORTHOPAEDIC SURGERY

## 2021-12-01 ENCOUNTER — ANESTHESIA EVENT (OUTPATIENT)
Dept: PERIOP | Facility: HOSPITAL | Age: 66
End: 2021-12-01

## 2021-12-01 RX ORDER — FAMOTIDINE 10 MG/ML
20 INJECTION, SOLUTION INTRAVENOUS ONCE
Status: CANCELLED | OUTPATIENT
Start: 2021-12-01 | End: 2021-12-01

## 2021-12-02 ENCOUNTER — ANESTHESIA (OUTPATIENT)
Dept: PERIOP | Facility: HOSPITAL | Age: 66
End: 2021-12-02

## 2021-12-02 ENCOUNTER — APPOINTMENT (OUTPATIENT)
Dept: GENERAL RADIOLOGY | Facility: HOSPITAL | Age: 66
End: 2021-12-02

## 2021-12-02 ENCOUNTER — ANESTHESIA EVENT CONVERTED (OUTPATIENT)
Dept: ANESTHESIOLOGY | Facility: HOSPITAL | Age: 66
End: 2021-12-02

## 2021-12-02 ENCOUNTER — HOSPITAL ENCOUNTER (OUTPATIENT)
Facility: HOSPITAL | Age: 66
Discharge: HOME | End: 2021-12-03
Attending: ORTHOPAEDIC SURGERY | Admitting: ORTHOPAEDIC SURGERY

## 2021-12-02 DIAGNOSIS — Z96.651 STATUS POST TOTAL RIGHT KNEE REPLACEMENT: Primary | ICD-10-CM

## 2021-12-02 DIAGNOSIS — M17.11 PRIMARY LOCALIZED OSTEOARTHRITIS OF RIGHT KNEE: ICD-10-CM

## 2021-12-02 PROCEDURE — 25010000002 MORPHINE PER 10 MG: Performed by: ORTHOPAEDIC SURGERY

## 2021-12-02 PROCEDURE — 25010000002 ROPIVACAINE PER 1 MG: Performed by: ORTHOPAEDIC SURGERY

## 2021-12-02 PROCEDURE — 25010000002 ROPIVACINE HCL-NACL: Performed by: NURSE ANESTHETIST, CERTIFIED REGISTERED

## 2021-12-02 PROCEDURE — 25010000002 PROPOFOL 10 MG/ML EMULSION: Performed by: NURSE ANESTHETIST, CERTIFIED REGISTERED

## 2021-12-02 PROCEDURE — C1776 JOINT DEVICE (IMPLANTABLE): HCPCS | Performed by: ORTHOPAEDIC SURGERY

## 2021-12-02 PROCEDURE — 0 CEFAZOLIN IN DEXTROSE 2-4 GM/100ML-% SOLUTION: Performed by: ORTHOPAEDIC SURGERY

## 2021-12-02 PROCEDURE — 25010000002 ONDANSETRON PER 1 MG: Performed by: NURSE ANESTHETIST, CERTIFIED REGISTERED

## 2021-12-02 PROCEDURE — C1713 ANCHOR/SCREW BN/BN,TIS/BN: HCPCS | Performed by: ORTHOPAEDIC SURGERY

## 2021-12-02 PROCEDURE — 97116 GAIT TRAINING THERAPY: CPT

## 2021-12-02 PROCEDURE — C1755 CATHETER, INTRASPINAL: HCPCS | Performed by: ORTHOPAEDIC SURGERY

## 2021-12-02 PROCEDURE — C1889 IMPLANT/INSERT DEVICE, NOC: HCPCS | Performed by: ORTHOPAEDIC SURGERY

## 2021-12-02 PROCEDURE — 25010000002 DEXAMETHASONE PER 1 MG: Performed by: NURSE ANESTHETIST, CERTIFIED REGISTERED

## 2021-12-02 PROCEDURE — 97161 PT EVAL LOW COMPLEX 20 MIN: CPT

## 2021-12-02 PROCEDURE — 73560 X-RAY EXAM OF KNEE 1 OR 2: CPT

## 2021-12-02 DEVICE — CMT BONE PALACOS R HI/VISC 1X40: Type: IMPLANTABLE DEVICE | Site: KNEE | Status: FUNCTIONAL

## 2021-12-02 DEVICE — GENESIS II RESURFACING PATELLAR                                    PROSTHESIS  32MM
Type: IMPLANTABLE DEVICE | Site: KNEE | Status: FUNCTIONAL
Brand: GENESIS II

## 2021-12-02 DEVICE — DEV CONTRL TISS STRATAFIX SYMM PDS PLUS VIL CT-1 45CM: Type: IMPLANTABLE DEVICE | Site: KNEE | Status: FUNCTIONAL

## 2021-12-02 DEVICE — LEGION POSTERIOR STABILIZED                                    OXINIUM FEMORAL SIZE 6 RIGHT
Type: IMPLANTABLE DEVICE | Site: KNEE | Status: FUNCTIONAL
Brand: LEGION

## 2021-12-02 DEVICE — IMPLANTABLE DEVICE: Type: IMPLANTABLE DEVICE | Site: KNEE | Status: FUNCTIONAL

## 2021-12-02 DEVICE — GENESIS II NON-POROUS TIBIAL                                    BASEPLATE SIZE 5 RIGHT
Type: IMPLANTABLE DEVICE | Site: KNEE | Status: FUNCTIONAL
Brand: GENESIS II

## 2021-12-02 DEVICE — DEV CONTRL TISS STRATAFIX SPIRAL PGPCL 2/0FS 30X30CM: Type: IMPLANTABLE DEVICE | Site: KNEE | Status: FUNCTIONAL

## 2021-12-02 DEVICE — LEGION POSTERIOR STABILIZED HIGH                                    FLEX HIGHLY CROSS LINKED                                    POLYETHYLENE SIZE 5-6 9MM
Type: IMPLANTABLE DEVICE | Site: KNEE | Status: FUNCTIONAL
Brand: LEGION

## 2021-12-02 RX ORDER — BUPIVACAINE HYDROCHLORIDE 5 MG/ML
INJECTION, SOLUTION PERINEURAL
Status: COMPLETED | OUTPATIENT
Start: 2021-12-02 | End: 2021-12-02

## 2021-12-02 RX ORDER — LABETALOL HYDROCHLORIDE 5 MG/ML
10 INJECTION, SOLUTION INTRAVENOUS EVERY 4 HOURS PRN
Status: DISCONTINUED | OUTPATIENT
Start: 2021-12-02 | End: 2021-12-03 | Stop reason: HOSPADM

## 2021-12-02 RX ORDER — ACETAMINOPHEN 500 MG
1000 TABLET ORAL EVERY 6 HOURS
Status: DISCONTINUED | OUTPATIENT
Start: 2021-12-02 | End: 2021-12-03 | Stop reason: HOSPADM

## 2021-12-02 RX ORDER — SODIUM CHLORIDE 0.9 % (FLUSH) 0.9 %
10 SYRINGE (ML) INJECTION AS NEEDED
Status: DISCONTINUED | OUTPATIENT
Start: 2021-12-02 | End: 2021-12-02 | Stop reason: HOSPADM

## 2021-12-02 RX ORDER — NALOXONE HCL 0.4 MG/ML
0.1 VIAL (ML) INJECTION
Status: DISCONTINUED | OUTPATIENT
Start: 2021-12-02 | End: 2021-12-03 | Stop reason: HOSPADM

## 2021-12-02 RX ORDER — PROPOFOL 10 MG/ML
VIAL (ML) INTRAVENOUS AS NEEDED
Status: DISCONTINUED | OUTPATIENT
Start: 2021-12-02 | End: 2021-12-02 | Stop reason: SURG

## 2021-12-02 RX ORDER — ONDANSETRON 2 MG/ML
4 INJECTION INTRAMUSCULAR; INTRAVENOUS EVERY 6 HOURS PRN
Status: DISCONTINUED | OUTPATIENT
Start: 2021-12-02 | End: 2021-12-03 | Stop reason: HOSPADM

## 2021-12-02 RX ORDER — SODIUM CHLORIDE 0.9 % (FLUSH) 0.9 %
10 SYRINGE (ML) INJECTION EVERY 12 HOURS SCHEDULED
Status: DISCONTINUED | OUTPATIENT
Start: 2021-12-02 | End: 2021-12-02 | Stop reason: HOSPADM

## 2021-12-02 RX ORDER — BISACODYL 5 MG/1
10 TABLET, DELAYED RELEASE ORAL DAILY PRN
Status: DISCONTINUED | OUTPATIENT
Start: 2021-12-02 | End: 2021-12-03 | Stop reason: HOSPADM

## 2021-12-02 RX ORDER — LIDOCAINE HYDROCHLORIDE 10 MG/ML
INJECTION, SOLUTION EPIDURAL; INFILTRATION; INTRACAUDAL; PERINEURAL AS NEEDED
Status: DISCONTINUED | OUTPATIENT
Start: 2021-12-02 | End: 2021-12-02 | Stop reason: SURG

## 2021-12-02 RX ORDER — MIDAZOLAM HYDROCHLORIDE 1 MG/ML
0.5 INJECTION INTRAMUSCULAR; INTRAVENOUS
Status: DISCONTINUED | OUTPATIENT
Start: 2021-12-02 | End: 2021-12-02 | Stop reason: HOSPADM

## 2021-12-02 RX ORDER — DIPHENHYDRAMINE HCL 25 MG
25 CAPSULE ORAL EVERY 6 HOURS PRN
Status: DISCONTINUED | OUTPATIENT
Start: 2021-12-02 | End: 2021-12-03 | Stop reason: HOSPADM

## 2021-12-02 RX ORDER — ONDANSETRON 2 MG/ML
INJECTION INTRAMUSCULAR; INTRAVENOUS AS NEEDED
Status: DISCONTINUED | OUTPATIENT
Start: 2021-12-02 | End: 2021-12-02 | Stop reason: SURG

## 2021-12-02 RX ORDER — OXYCODONE HYDROCHLORIDE 5 MG/1
10 TABLET ORAL EVERY 4 HOURS PRN
Status: DISCONTINUED | OUTPATIENT
Start: 2021-12-02 | End: 2021-12-03 | Stop reason: HOSPADM

## 2021-12-02 RX ORDER — CEFAZOLIN SODIUM 2 G/100ML
2 INJECTION, SOLUTION INTRAVENOUS ONCE
Status: COMPLETED | OUTPATIENT
Start: 2021-12-02 | End: 2021-12-02

## 2021-12-02 RX ORDER — ONDANSETRON 4 MG/1
4 TABLET, FILM COATED ORAL EVERY 6 HOURS PRN
Status: DISCONTINUED | OUTPATIENT
Start: 2021-12-02 | End: 2021-12-03 | Stop reason: HOSPADM

## 2021-12-02 RX ORDER — HYDROMORPHONE HYDROCHLORIDE 1 MG/ML
0.5 INJECTION, SOLUTION INTRAMUSCULAR; INTRAVENOUS; SUBCUTANEOUS
Status: DISCONTINUED | OUTPATIENT
Start: 2021-12-02 | End: 2021-12-03 | Stop reason: HOSPADM

## 2021-12-02 RX ORDER — BISACODYL 10 MG
10 SUPPOSITORY, RECTAL RECTAL DAILY PRN
Status: DISCONTINUED | OUTPATIENT
Start: 2021-12-02 | End: 2021-12-03 | Stop reason: HOSPADM

## 2021-12-02 RX ORDER — DEXAMETHASONE SODIUM PHOSPHATE 4 MG/ML
INJECTION, SOLUTION INTRA-ARTICULAR; INTRALESIONAL; INTRAMUSCULAR; INTRAVENOUS; SOFT TISSUE AS NEEDED
Status: DISCONTINUED | OUTPATIENT
Start: 2021-12-02 | End: 2021-12-02 | Stop reason: SURG

## 2021-12-02 RX ORDER — CEFAZOLIN SODIUM 2 G/100ML
2 INJECTION, SOLUTION INTRAVENOUS EVERY 8 HOURS
Status: COMPLETED | OUTPATIENT
Start: 2021-12-02 | End: 2021-12-03

## 2021-12-02 RX ORDER — SODIUM CHLORIDE 9 MG/ML
100 INJECTION, SOLUTION INTRAVENOUS CONTINUOUS
Status: DISCONTINUED | OUTPATIENT
Start: 2021-12-02 | End: 2021-12-03 | Stop reason: HOSPADM

## 2021-12-02 RX ORDER — MAGNESIUM HYDROXIDE 1200 MG/15ML
LIQUID ORAL AS NEEDED
Status: DISCONTINUED | OUTPATIENT
Start: 2021-12-02 | End: 2021-12-02 | Stop reason: HOSPADM

## 2021-12-02 RX ORDER — ACETAMINOPHEN 500 MG
1000 TABLET ORAL ONCE
Status: COMPLETED | OUTPATIENT
Start: 2021-12-02 | End: 2021-12-02

## 2021-12-02 RX ORDER — MELOXICAM 7.5 MG/1
15 TABLET ORAL DAILY
Status: DISCONTINUED | OUTPATIENT
Start: 2021-12-02 | End: 2021-12-03 | Stop reason: HOSPADM

## 2021-12-02 RX ORDER — ONDANSETRON 2 MG/ML
4 INJECTION INTRAMUSCULAR; INTRAVENOUS EVERY 6 HOURS PRN
Status: DISCONTINUED | OUTPATIENT
Start: 2021-12-02 | End: 2021-12-02 | Stop reason: SDUPTHER

## 2021-12-02 RX ORDER — ASPIRIN 325 MG
325 TABLET, DELAYED RELEASE (ENTERIC COATED) ORAL DAILY
Status: DISCONTINUED | OUTPATIENT
Start: 2021-12-03 | End: 2021-12-03 | Stop reason: HOSPADM

## 2021-12-02 RX ORDER — OXYCODONE HYDROCHLORIDE 5 MG/1
5 TABLET ORAL EVERY 4 HOURS PRN
Status: DISCONTINUED | OUTPATIENT
Start: 2021-12-02 | End: 2021-12-03 | Stop reason: HOSPADM

## 2021-12-02 RX ORDER — DOCUSATE SODIUM 100 MG/1
100 CAPSULE, LIQUID FILLED ORAL 2 TIMES DAILY PRN
Status: DISCONTINUED | OUTPATIENT
Start: 2021-12-02 | End: 2021-12-03 | Stop reason: HOSPADM

## 2021-12-02 RX ORDER — FAMOTIDINE 20 MG/1
20 TABLET, FILM COATED ORAL ONCE
Status: COMPLETED | OUTPATIENT
Start: 2021-12-02 | End: 2021-12-02

## 2021-12-02 RX ORDER — LIDOCAINE HYDROCHLORIDE 10 MG/ML
0.5 INJECTION, SOLUTION EPIDURAL; INFILTRATION; INTRACAUDAL; PERINEURAL ONCE AS NEEDED
Status: COMPLETED | OUTPATIENT
Start: 2021-12-02 | End: 2021-12-02

## 2021-12-02 RX ORDER — BUPIVACAINE HYDROCHLORIDE 2.5 MG/ML
INJECTION, SOLUTION EPIDURAL; INFILTRATION; INTRACAUDAL
Status: COMPLETED | OUTPATIENT
Start: 2021-12-02 | End: 2021-12-02

## 2021-12-02 RX ORDER — SODIUM CHLORIDE, SODIUM LACTATE, POTASSIUM CHLORIDE, CALCIUM CHLORIDE 600; 310; 30; 20 MG/100ML; MG/100ML; MG/100ML; MG/100ML
9 INJECTION, SOLUTION INTRAVENOUS CONTINUOUS
Status: DISCONTINUED | OUTPATIENT
Start: 2021-12-02 | End: 2021-12-03 | Stop reason: HOSPADM

## 2021-12-02 RX ORDER — LISINOPRIL 20 MG/1
20 TABLET ORAL DAILY
Status: DISCONTINUED | OUTPATIENT
Start: 2021-12-02 | End: 2021-12-03 | Stop reason: HOSPADM

## 2021-12-02 RX ORDER — AMOXICILLIN 250 MG
2 CAPSULE ORAL 2 TIMES DAILY PRN
Status: DISCONTINUED | OUTPATIENT
Start: 2021-12-02 | End: 2021-12-03 | Stop reason: HOSPADM

## 2021-12-02 RX ORDER — PREGABALIN 75 MG/1
75 CAPSULE ORAL ONCE
Status: COMPLETED | OUTPATIENT
Start: 2021-12-02 | End: 2021-12-02

## 2021-12-02 RX ORDER — KETOROLAC TROMETHAMINE 30 MG/ML
15 INJECTION, SOLUTION INTRAMUSCULAR; INTRAVENOUS EVERY 6 HOURS PRN
Status: DISCONTINUED | OUTPATIENT
Start: 2021-12-02 | End: 2021-12-03 | Stop reason: HOSPADM

## 2021-12-02 RX ORDER — DIPHENHYDRAMINE HYDROCHLORIDE 50 MG/ML
25 INJECTION INTRAMUSCULAR; INTRAVENOUS EVERY 6 HOURS PRN
Status: DISCONTINUED | OUTPATIENT
Start: 2021-12-02 | End: 2021-12-03 | Stop reason: HOSPADM

## 2021-12-02 RX ADMIN — BUPIVACAINE HYDROCHLORIDE 30 ML: 2.5 INJECTION, SOLUTION EPIDURAL; INFILTRATION; INTRACAUDAL; PERINEURAL at 13:54

## 2021-12-02 RX ADMIN — PROPOFOL 50 MG: 10 INJECTION, EMULSION INTRAVENOUS at 11:55

## 2021-12-02 RX ADMIN — ROPIVACAINE HYDROCHLORIDE 10 ML/HR: 2 INJECTION, SOLUTION EPIDURAL; INFILTRATION at 13:59

## 2021-12-02 RX ADMIN — LIDOCAINE HYDROCHLORIDE 50 MG: 10 INJECTION, SOLUTION EPIDURAL; INFILTRATION; INTRACAUDAL; PERINEURAL at 11:55

## 2021-12-02 RX ADMIN — PREGABALIN 75 MG: 75 CAPSULE ORAL at 10:59

## 2021-12-02 RX ADMIN — ACETAMINOPHEN 1000 MG: 500 TABLET, FILM COATED ORAL at 23:51

## 2021-12-02 RX ADMIN — OXYCODONE 5 MG: 5 TABLET ORAL at 19:10

## 2021-12-02 RX ADMIN — BUPIVACAINE HYDROCHLORIDE 1.8 ML: 5 INJECTION, SOLUTION PERINEURAL at 11:57

## 2021-12-02 RX ADMIN — PROPOFOL 88 MCG/KG/MIN: 10 INJECTION, EMULSION INTRAVENOUS at 12:08

## 2021-12-02 RX ADMIN — ONDANSETRON 4 MG: 2 INJECTION INTRAMUSCULAR; INTRAVENOUS at 12:44

## 2021-12-02 RX ADMIN — CEFAZOLIN SODIUM 2 G: 2 INJECTION, SOLUTION INTRAVENOUS at 11:50

## 2021-12-02 RX ADMIN — LIDOCAINE HYDROCHLORIDE 0.5 ML: 10 INJECTION, SOLUTION EPIDURAL; INFILTRATION; INTRACAUDAL; PERINEURAL at 10:56

## 2021-12-02 RX ADMIN — FAMOTIDINE 20 MG: 20 TABLET ORAL at 10:59

## 2021-12-02 RX ADMIN — SODIUM CHLORIDE, POTASSIUM CHLORIDE, SODIUM LACTATE AND CALCIUM CHLORIDE: 600; 310; 30; 20 INJECTION, SOLUTION INTRAVENOUS at 12:51

## 2021-12-02 RX ADMIN — DEXAMETHASONE SODIUM PHOSPHATE 8 MG: 4 INJECTION, SOLUTION INTRA-ARTICULAR; INTRALESIONAL; INTRAMUSCULAR; INTRAVENOUS; SOFT TISSUE at 12:08

## 2021-12-02 RX ADMIN — CEFAZOLIN SODIUM 2 G: 2 INJECTION, SOLUTION INTRAVENOUS at 21:19

## 2021-12-02 RX ADMIN — ACETAMINOPHEN 1000 MG: 500 TABLET, FILM COATED ORAL at 19:10

## 2021-12-02 RX ADMIN — ACETAMINOPHEN 1000 MG: 500 TABLET ORAL at 10:59

## 2021-12-02 RX ADMIN — SODIUM CHLORIDE, POTASSIUM CHLORIDE, SODIUM LACTATE AND CALCIUM CHLORIDE 9 ML/HR: 600; 310; 30; 20 INJECTION, SOLUTION INTRAVENOUS at 10:56

## 2021-12-02 RX ADMIN — MELOXICAM 15 MG: 7.5 TABLET ORAL at 19:10

## 2021-12-02 NOTE — ANESTHESIA PROCEDURE NOTES
RIGHT ADDUCTOR CANAL CATHETER      Patient reassessed immediately prior to procedure    Patient location during procedure: post-op  Reason for block: at surgeon's request and post-op pain management  Performed by  CRNA: Viktor Castillo, CRNA  Assisted by: Ila Mir RN  Preanesthetic Checklist  Completed: patient identified, IV checked, site marked, risks and benefits discussed, surgical consent, monitors and equipment checked, pre-op evaluation and timeout performed  Prep:  Pt Position: supine  Sterile barriers:cap, gloves, mask and sterile barriers  Prep: ChloraPrep  Patient monitoring: blood pressure monitoring, continuous pulse oximetry and EKG  Procedure  Performed under: spinal  Guidance:ultrasound guided  Images:still images obtained, printed/placed on chart    Laterality:right  Block Type:adductor canal block  Injection Technique:catheter  Needle Type:Tuohy and echogenic  Needle Gauge:18 G  Resistance on Injection: none  Catheter Size:20 G (20g)  Cath Depth at skin: 9 cm    Medications Used: bupivacaine PF (MARCAINE) 0.25 % injection, 30 mL  Med administered at 12/2/2021 1:54 PM      Post Assessment  Injection Assessment: negative aspiration for heme, incremental injection and no paresthesia on injection  Patient Tolerance:comfortable throughout block  Complications:no  Additional Notes  Procedure:             The pt was placed in the Supine position.  The Insertion site was  prepped and Draped in sterile fashion.  The pt was anesthetized with  IV Sedation( see meds).  Skin and cutaneous tissue was infiltrated and anesthetized with 1% Lidocaine 3 mls via a 25g needle.  A BBraun 4 inch 18g echogenic needle was then  inserted approximately midline, mid-thigh and advanced In-plane with Ultrasound guidance.  Normal Saline PSF was utilized for hydrodissection of tissue.  The Vastus medialis and Sartorius muscle where visualized and the needle tip was placed in the adductor canal,  lateral to the femoral artery.   LA injection spread was visualized, injection was incremental 1-5ml, injection pressure was normal or little, no intraneural injection, no vascular injection.  LA dose was injected thru the needle(see dose above).  A BBraun 20g wire stylet catheter was placed via the needle with ultrasound visualization and confirmation with NS fluid bolus. The catheter insertion site was sealed with exofin tissue adhesive. The labeled catheter was then coiled and secured to skin with benzoin,  steristrips and CHG transparent dressing.  Appropriate labels were applied.  Thank you.

## 2021-12-02 NOTE — H&P
Pre-Op H&P  Umberto Vigil  [id number]  1955      Chief complaint: Right knee pain      Subjective:  Patient is a 66 y.o.male presents for scheduled surgery by Dr. Mendez. He anticipates a TOTAL KNEE ARTHROPLASTY RIGHT today. His knee has been painful for many years. He occasionally uses a knee brace. He had 2 steroid injections with minimal benefit. Denies changes his health since his last office visit.      Review of Systems:  Constitutional-- No fever, chills or sweats. No fatigue.  CV-- No chest pain, palpitation or syncope. +HTN  Resp-- No SOB, cough, hemoptysis  Skin--No rashes or lesions      Allergies: No Known Allergies      Home Meds:  Medications Prior to Admission   Medication Sig Dispense Refill Last Dose   • Indomethacin 20 MG capsule Take  by mouth.   Past Month at Unknown time   • lisinopril (PRINIVIL,ZESTRIL) 20 MG tablet Take 1 tablet by mouth Daily. as directed 90 tablet 3 12/1/2021 at 0800   • methylPREDNISolone (MEDROL) 4 MG dose pack Take as directed on package instructions. 1 each 0          PMH:   Past Medical History:   Diagnosis Date   • Allergy    • Atypical chest pain    • Fracture     h/o   • H/O chest x-ray 08/24/2015    Minimal left basilar scar and atelectasis. Otherwise chronic appearing findings. F/U PA and lateral views would be helpful for a more complete evaluation   • H/O renal calculi    • History of PFTs 09/15/2015    No obstruction or restrictions, normal diffusion   • Hypertension    • Malignant melanoma (HCC)     h/o   • S/P bronchoscopy 10/14/2015    Left lung was normal. Distortion found in anterior segment of RUL. An endochonchial lavahe was performed. Brushinhs were obtained. Examinamtion was doubtfully normal. Abnormal CT scan of chest   • Squamous cell carcinoma in situ of skin     h/o   • Tinea pedis    • Tinnitus      PSH:    Past Surgical History:   Procedure Laterality Date   • BRONCHOSCOPY        Status post bronchoscopy 10/14/2015 with pathology and  "cytology negative for malignancy   • COLONOSCOPY     • HAND SURGERY     • OTHER SURGICAL HISTORY      Biopsy of skin   • SKIN CANCER EXCISION         Immunization History:  Influenza:   Pneumococcal: UTD  Tetanus: UTD  Covid x3:     Social History:   Tobacco:   Social History     Tobacco Use   Smoking Status Former Smoker   • Types: Cigarettes   • Quit date:    • Years since quittin.9   Smokeless Tobacco Never Used      Alcohol:     Social History     Substance and Sexual Activity   Alcohol Use Yes   • Alcohol/week: 2.0 - 3.0 standard drinks   • Types: 2 - 3 Cans of beer per week    Comment: once every few weeks         Physical Exam:/90 (BP Location: Right arm, Patient Position: Lying)   Pulse 71   Temp 97 °F (36.1 °C) (Temporal)   Resp 18   Ht 180.3 cm (71\")   Wt 79.4 kg (175 lb)   SpO2 99%   BMI 24.41 kg/m²       General Appearance:    Alert, cooperative, no distress, appears stated age   Head:    Normocephalic, without obvious abnormality, atraumatic   Lungs:     Clear to auscultation bilaterally, respirations unlabored    Heart:   Regular rate and rhythm, S1 and S2 normal    Abdomen:    Soft without tenderness   Extremities:   Extremities normal, atraumatic, no cyanosis or edema   Skin:   Skin color, texture, turgor normal, no rashes or lesions   Neurologic:   Grossly intact     Results Review:     LABS:  Lab Results   Component Value Date    WBC 9.13 2021    HGB 15.7 2021    HCT 46.1 2021    MCV 92.0 2021     2021    NEUTROABS 5.28 2020    GLUCOSE 95 2021    BUN 24 (H) 2021    CREATININE 1.02 2021    EGFRIFNONA 73 2021    EGFRIFAFRI 86 2020     2021    K 4.6 2021     2021    CO2 28.0 2021    CALCIUM 9.8 2021    ALBUMIN 4.60 2020    AST 18 2020    ALT 16 2020    BILITOT 0.5 2020       RADIOLOGY:  Imaging Results (Last 72 Hours)     ** No results " found for the last 72 hours. **          I reviewed the patient's new clinical results.    Cancer Staging (if applicable)  Cancer Patient: __ yes __no __unknown; If yes, clinical stage T:__ N:__M:__, stage group or __N/A      Impression: Right knee pain      Plan: TOTAL KNEE ARTHROPLASTY RIGHT      Carley AMY Clifton   12/2/2021   11:16 EST

## 2021-12-02 NOTE — ANESTHESIA PROCEDURE NOTES
Spinal Block      Patient reassessed immediately prior to procedure    Patient location during procedure: OR  Indication:at surgeon's request  Performed By  CRNA: Emily Garcia CRNA  Preanesthetic Checklist  Completed: patient identified, IV checked, site marked, risks and benefits discussed, surgical consent, monitors and equipment checked, pre-op evaluation and timeout performed  Spinal Block Prep:  Patient Position:sitting  Sterile Tech:cap, gloves, sterile barriers and mask  Prep:Chloraprep  Patient Monitoring:blood pressure monitoring, continuous pulse oximetry and EKG  Spinal Block Procedure  Approach:midline  Guidance:landmark technique and palpation technique  Location:L4-L5  Needle Type:Sprotte  Needle Gauge:25 G  Placement of Spinal needle event:cerebrospinal fluid aspirated  Paresthesia: no  Fluid Appearance:clear  Medications: bupivacaine (MARCAINE) 0.5 % injection, 1.8 mL  Med Administered at 12/2/2021 11:57 AM   Post Assessment  Patient Tolerance:patient tolerated the procedure well with no apparent complications  Complications no  Additional Notes  Procedure:  Pt assisted to sitting position, with legs in position of comfort over side of bed.  Pt. instructed in optimal spine presentation, the spine was prepped/ Draped and the skin at insertion site was anesthetized with 1% Lidocaine 2 ml.  The spinal needle was then advanced until CSF flow was obtained and LA was injected:

## 2021-12-02 NOTE — THERAPY EVALUATION
Patient Name: Umberto Vigil  : 1955    MRN: [id number]                              Today's Date: 2021       Admit Date: 2021    Visit Dx: No diagnosis found.  Patient Active Problem List   Diagnosis   • Vitamin D deficiency   • Hypertension   • Gout   • Allergic rhinitis   • Medicare annual wellness visit, initial   • Screening PSA (prostate specific antigen)   • Left hip pain   • Dermatitis   • Osteoarthritis of right knee   • Primary localized osteoarthritis of right knee   • Status post total right knee replacement     Past Medical History:   Diagnosis Date   • Allergy    • Atypical chest pain    • Fracture     h/o   • H/O chest x-ray 2015    Minimal left basilar scar and atelectasis. Otherwise chronic appearing findings. F/U PA and lateral views would be helpful for a more complete evaluation   • H/O renal calculi    • History of PFTs 09/15/2015    No obstruction or restrictions, normal diffusion   • Hypertension    • Malignant melanoma (HCC)     h/o   • S/P bronchoscopy 10/14/2015    Left lung was normal. Distortion found in anterior segment of RUL. An endochonchial lavahe was performed. Brushinhs were obtained. Examinamtion was doubtfully normal. Abnormal CT scan of chest   • Squamous cell carcinoma in situ of skin     h/o   • Tinea pedis    • Tinnitus      Past Surgical History:   Procedure Laterality Date   • BRONCHOSCOPY        Status post bronchoscopy 10/14/2015 with pathology and cytology negative for malignancy   • COLONOSCOPY     • HAND SURGERY     • OTHER SURGICAL HISTORY      Biopsy of skin   • SKIN CANCER EXCISION        General Information     Row Name 21 1630          Physical Therapy Time and Intention    Document Type evaluation  -NOEMY     Mode of Treatment individual therapy; physical therapy  -NOEMY     Row Name 21 6080          General Information    Patient Profile Reviewed yes  -NOEMY     Prior Level of Function min assist:; all household mobility; transfer;  bed mobility; ADL's  -NOEMY     Existing Precautions/Restrictions other (see comments)  R adductor nerve cath  -     Barriers to Rehab none identified  -     Row Name 12/02/21 1630          Living Environment    Lives With spouse  -     Row Name 12/02/21 1630          Home Main Entrance    Number of Stairs, Main Entrance three  -NOEMY     Stair Railings, Main Entrance railing on left side (ascending)  -     Row Name 12/02/21 1630          Stairs Within Home, Primary    Stairs, Within Home, Primary 0  -NOEMY     Number of Stairs, Within Home, Primary none  -NOEMY     Row Name 12/02/21 1630          Cognition    Orientation Status (Cognition) oriented x 4  -NOEMY     Row Name 12/02/21 1630          Safety Issues, Functional Mobility    Safety Issues Affecting Function (Mobility) safety precaution awareness; safety precautions follow-through/compliance  -     Impairments Affecting Function (Mobility) endurance/activity tolerance; strength; pain; range of motion (ROM)  -           User Key  (r) = Recorded By, (t) = Taken By, (c) = Cosigned By    Initials Name Provider Type    NOEYM Boone Mackay PT Physical Therapist               Mobility     Row Name 12/02/21 1630          Bed Mobility    Bed Mobility scooting/bridging; supine-sit; sit-supine  -NOEMY     Scooting/Bridging [place] (Bed Mobility) supervision; verbal cues  -NOEMY     Supine-Sit [place] (Bed Mobility) supervision; verbal cues  -NOEMY     Sit-Supine [place] (Bed Mobility) supervision; verbal cues  -NOEMY     Assistive Device (Bed Mobility) bed rails; head of bed elevated  -     Comment (Bed Mobility) Verbal cues for LE sequencing off of/onto EOB and trunk control into sitting/supine  -     Row Name 12/02/21 1630          Transfers    Comment (Transfers) Verbal cues for safe hand placement during standing/sitting and moving R LE out for comfort prior to sitting  -     Row Name 12/02/21 1630          Sit-Stand Transfer    Sit-Stand [place] (Transfers)  verbal cues; contact guard  -     Assistive Device (Sit-Stand Transfers) walker, front-wheeled  -     Row Name 12/02/21 1630          Gait/Stairs (Locomotion)    [place] Level (Gait) verbal cues; contact guard; 1 person to manage equipment  -     Assistive Device (Gait) walker, front-wheeled  -     Distance in Feet (Gait) 200  -NOEMY     Deviations/Abnormal Patterns (Gait) bilateral deviations; carmenza decreased; gait speed decreased; stride length decreased  -     Bilateral Gait Deviations forward flexed posture  -     Right Sided Gait Deviations heel strike decreased; weight shift ability decreased  -     [place] Level (Stairs) not tested  -     Comment (Gait/Stairs) Pt ambulated with step through pattern and decreased speed. Verbal cues for maintaining upright posture, body within walker, increase step length, and WB through LEs. Gait limited by fatigue. No knee buckling noted.  -     Row Name 12/02/21 1630          Mobility    Extremity Weight-bearing Status right lower extremity  -     Right Lower Extremity (Weight-bearing Status) weight-bearing as tolerated (WBAT)  -           User Key  (r) = Recorded By, (t) = Taken By, (c) = Cosigned By    Initials Name Provider Type    NOEMY Boone Mackay, PT Physical Therapist               Obj/Interventions     Row Name 12/02/21 1630          Range of Motion Comprehensive    General Range of Motion lower extremity range of motion deficits identified  -     Comment, General Range of Motion R LE AROM impaired 25%; L LE AROM WFL; able to actively DF/PF  -     Row Name 12/02/21 1630          Strength Comprehensive (MMT)    General Manual Muscle Testing (MMT) Assessment lower extremity strength deficits identified  -     Comment, General Manual Muscle Testing (MMT) Assessment R LE functionally 4-/5; L LE functionally 4+/5; IND with SLR  -     Row Name 12/02/21 1630          Motor Skills    Therapeutic Exercise hip; knee; ankle  -     Row Name  12/02/21 1630          Hip (Therapeutic Exercise)    Hip (Therapeutic Exercise) isometric exercises  -     Hip Isometrics (Therapeutic Exercise) gluteal sets; 10 repetitions  -     Row Name 12/02/21 1630          Knee (Therapeutic Exercise)    Knee (Therapeutic Exercise) isometric exercises  -NOEMY     Knee Isometrics (Therapeutic Exercise) quad sets; 10 repetitions  -     Row Name 12/02/21 1630          Ankle (Therapeutic Exercise)    Ankle (Therapeutic Exercise) AROM (active range of motion)  -     Ankle AROM (Therapeutic Exercise) bilateral; dorsiflexion; plantarflexion; 10 repetitions  -     Row Name 12/02/21 1630          Sensory Assessment (Somatosensory)    Sensory Assessment (Somatosensory) LE sensation intact  -           User Key  (r) = Recorded By, (t) = Taken By, (c) = Cosigned By    Initials Name Provider Type    NOEMY Boone Mackay, PT Physical Therapist               Goals/Plan     Row Name 12/02/21 1630          Bed Mobility Goal 1 (PT)    Activity/Assistive Device (Bed Mobility Goal 1, PT) sit to supine/supine to sit  -NOEMY     [place] Level/Cues Needed (Bed Mobility Goal 1, PT) modified independence  -NOEMY     Time Frame (Bed Mobility Goal 1, PT) long term goal (LTG); 3 days  -     Row Name 12/02/21 1630          Transfer Goal 1 (PT)    Activity/Assistive Device (Transfer Goal 1, PT) sit-to-stand/stand-to-sit; walker, rolling  -NOEMY     [place] Level/Cues Needed (Transfer Goal 1, PT) modified independence  -NOEMY     Time Frame (Transfer Goal 1, PT) long term goal (LTG); 3 days  -     Row Name 12/02/21 1630          Gait Training Goal 1 (PT)    Activity/Assistive Device (Gait Training Goal 1, PT) gait (walking locomotion); walker, rolling  -NOEMY     [place] Level (Gait Training Goal 1, PT) modified independence  -NOEMY     Distance (Gait Training Goal 1, PT) 300 feet  -NOEMY     Time Frame (Gait Training Goal 1, PT) long term goal (LTG); 3 days  -     Row Name 12/02/21 1630          ROM Goal  1 (PT)    ROM Goal 1 (PT) R knee AROM 0-90 degrees  -NOEMY     Time Frame (ROM Goal 1, PT) long-term goal (LTG); 3 days  -NOEMY     Row Name 12/02/21 1630          Stairs Goal 1 (PT)    Activity/Assistive Device (Stairs Goal 1, PT) stairs, all skills; using handrail, left; cane, straight  -NOEMY     [place] Level/Cues Needed (Stairs Goal 1, PT) contact guard assist  -NOEMY     Number of Stairs (Stairs Goal 1, PT) 3  -NOEMY     Time Frame (Stairs Goal 1, PT) long term goal (LTG); 3 days  -NOEMY           User Key  (r) = Recorded By, (t) = Taken By, (c) = Cosigned By    Initials Name Provider Type    Boone Vizcarra, PT Physical Therapist               Clinical Impression     Row Name 12/02/21 1630          Pain    Additional Documentation Pain Scale: Numbers Pre/Post-Treatment (Group)  -     Row Name 12/02/21 1630          Pain Scale: Numbers Pre/Post-Treatment    Pretreatment Pain Rating 1/10  -NOEMY     Posttreatment Pain Rating 2/10  -NOEMY     Pain Location - Side Right  -NOEMY     Pain Location - Orientation anterior  -NOEMY     Pain Location knee  -NOEMY     Pain Intervention(s) Ambulation/increased activity; Repositioned; Cold applied  -     Row Name 12/02/21 1630          Therapy Assessment/Plan (PT)    Patient/Family Therapy Goals Statement (PT) To return home  -NOEMY     Rehab Potential (PT) good, to achieve stated therapy goals  -NOEMY     Criteria for Skilled Interventions Met (PT) yes; skilled treatment is necessary; meets criteria  -     Row Name 12/02/21 1630          Positioning and Restraints    Pre-Treatment Position in bed  -NOEMY     Post Treatment Position bed  -NOEMY     In Bed notified nsg; fowlers; call light within reach; encouraged to call for assist; exit alarm on; with nsg  -NOEMY           User Key  (r) = Recorded By, (t) = Taken By, (c) = Cosigned By    Initials Name Provider Type    Boone Vizcarra, PT Physical Therapist               Outcome Measures     Row Name 12/02/21 1630          How much help from another person do you  currently need...    Turning from your back to your side while in flat bed without using bedrails? 4  -NOEMY     Moving from lying on back to sitting on the side of a flat bed without bedrails? 4  -NOEMY     Moving to and from a bed to a chair (including a wheelchair)? 3  -NOEMY     Standing up from a chair using your arms (e.g., wheelchair, bedside chair)? 3  -NOEMY     Climbing 3-5 steps with a railing? 3  -NOEMY     To walk in hospital room? 3  -NOEMY     AM-PAC 6 Clicks Score (PT) 20  -     Row Name 12/02/21 1630          PADD    Diagnosis 1  -NOEMY     Gender 2  -NOEMY     Age Group 1  -NOEMY     Gait Distance 1  -NOEMY     Assist Level 1  -NOEMY     Home Support 3  -NOEMY     PADD Score 9  -NOEMY     Patient Preference home with home health  -NOEMY     Prediction by PADD Score directly home (with home health or out-patient rehab)  -     Row Name 12/02/21 1630          Functional Assessment    Outcome Measure Options AM-PAC 6 Clicks Basic Mobility (PT); PADD  -NOEMY           User Key  (r) = Recorded By, (t) = Taken By, (c) = Cosigned By    Initials Name Provider Type    Boone Vizcarra, PT Physical Therapist                             Physical Therapy Education                 Title: PT OT SLP Therapies (Done)     Topic: Physical Therapy (Done)     Point: Mobility training (Done)     Learning Progress Summary           Patient Acceptance, E,D, VU by NOEMY at 12/2/2021 1630    Comment: Educated on safe sequencing with bed mobility, ambulatory transfers, and gait. Reviewed HEP and knee precautions.                   Point: Home exercise program (Done)     Learning Progress Summary           Patient Acceptance, E,D, VU by NOEMY at 12/2/2021 1630    Comment: Educated on safe sequencing with bed mobility, ambulatory transfers, and gait. Reviewed HEP and knee precautions.                   Point: Body mechanics (Done)     Learning Progress Summary           Patient Acceptance, E,D, VU by NOEMY at 12/2/2021 1630    Comment: Educated on safe sequencing with bed  mobility, ambulatory transfers, and gait. Reviewed HEP and knee precautions.                   Point: Precautions (Done)     Learning Progress Summary           Patient Acceptance, E,D, VU by NOEMY at 12/2/2021 1630    Comment: Educated on safe sequencing with bed mobility, ambulatory transfers, and gait. Reviewed HEP and knee precautions.                               User Key     Initials Effective Dates Name Provider Type Discipline    NOEMY 06/16/21 -  Boone Mackay, PT Physical Therapist PT              PT Recommendation and Plan  Planned Therapy Interventions (PT): balance training, bed mobility training, gait training, home exercise program, patient/family education, transfer training, ROM (range of motion), stair training, strengthening  Plan of Care Reviewed With: patient  Progress: improving  Outcome Summary: PT eval complete. Pt ambulated 200 feet using RW, CGA, and one person to manage equipment. Gait limited by fatigue. Bed mobility performed with supervision and STS with CGA. No knee buckling noted. Pt IND with SLR. Reviewed HEP and knee precautions. PADD score = 9. Recommend pt d/c home with assist and HHPT when appropriate.     Time Calculation:    PT Charges     Row Name 12/02/21 1630             Time Calculation    Start Time 1630  -NOEMY      PT Received On 12/02/21  -      PT Goal Re-Cert Due Date 12/12/21  -              Time Calculation- PT    Total Timed Code Minutes- PT 10 minute(s)  -NOEMY              Timed Charges    04221 - PT Therapeutic Exercise Minutes 2  -NOEMY      69295 - Gait Training Minutes  8  -NOEMY              Untimed Charges    PT Eval/Re-eval Minutes 33  -NOEMY              Total Minutes    Timed Charges Total Minutes 10  -NOEMY      Untimed Charges Total Minutes 33  -NOEMY       Total Minutes 43  -NOEMY            User Key  (r) = Recorded By, (t) = Taken By, (c) = Cosigned By    Initials Name Provider Type    Boone Vizcarra, PT Physical Therapist              Therapy Charges for Today     Code  Description Service Date Service Provider Modifiers Qty    86070816307 HC GAIT TRAINING EA 15 MIN 12/2/2021 Boone Mackay, PT GP 1    30035960063 HC PT EVAL LOW COMPLEXITY 3 12/2/2021 Boone Mackay, PT GP 1    04729218921 HC PT THER SUPP EA 15 MIN 12/2/2021 Boone Mackay, PT GP 3          PT G-Codes  Outcome Measure Options: AM-PAC 6 Clicks Basic Mobility (PT), PADD  AM-PAC 6 Clicks Score (PT): 20    Boone Mackay, PT  12/2/2021

## 2021-12-02 NOTE — OP NOTE
Preoperative diagnosis:Right knee end stage osteoarthritis    Postoperative diagnosis: Right knee end stage osteoarthritis    Operative procedure: Right total knee arthroplasty    Surgeon: Dr. Javy Mendez    Assistant:LEONIE Garcia.  Necessary during the case for positioning of the patient, exposure, implantation of components and closure.    Anesthesia: Spinal with local and adductor canal catheter    Estimated blood loss: Minimal    Implants: [employer]  posterior stabilized total knee arthroplasty size 6 femur, 5 tibia, 32 patella, 9 poly    Tourniquet time: 57 minutes at 300 mmHg    Indications for procedure: Umberto is a very pleasant 66-year-old male who has struggled with end-stage activity limiting right knee pain secondary to osteoarthritis.  X-rays in August revealed severe tricompartmental degenerative changes in a varus knee with complete loss of medial joint space and marginal osteophyte formation.  They have failed exhaustive conservative treatment measures including cortisone injections and physical therapy.  He takes ibuprofen 600 mg for his knee pain.  No relief with the last cortisone injection in March.  After undergoing a shared decision-making process they agreed to proceed with right total knee arthroplasty.  Surgical consent form was signed.    Description of procedure: The patient was seen in the preoperative holding area and the right leg was signed to confirm the correct operative site.  They were seen by anesthesia.  They received Ancef 2 g IV prophylactic antibiotics within 1 hour of incision time.  They were brought back to the operating room.  Spinal was performed without difficulty and they were given sedation throughout the case.  Patient placed in the supine position and all of  bony prominences were well-padded.  A bump was placed underneath the right hip.  Nonsterile tourniquet was applied to the thigh.  The right lower extremity was prepped and draped in the usual sterile  fashion and timeout was performed to confirm right total knee arthroplasty for patient Umberto Vigil.    The right lower extremity was exsanguinated with an Esmarch.  Tourniquet was inflated to 300 mmHg.  With the knee flexed a midline incision was made with a 10 blade scalpel.  Adequate hemostasis maintained with Bovie electrocautery.  Full-thickness medial and lateral flaps were elevated.  Using a fresh 10 blade scalpel I made a medial parapatellar arthrotomy.  The patella was everted.  Patella fat pad and anterior femoral fat pads were excised.  Marginal osteophytes were removed.  Medial release was performed for this varus knee using Bovie electrocautery.  Mary's line was marked.  Z retractors were placed medially and laterally.  The distal femur was then drilled and intramedullary distal femoral cutting guide was pinned in place set at a 5° valgus cut for a 9.5 mm cut.  This cut was then made with the oscillating saw.  The femur was then sized to a size 6 set at 3° of external rotation.  4-in-1 cutting guide was pinned in place.  Anterior and posterior cuts were made as were the chamfer cuts.  Cut bone was removed.  We then turned our attention to the tibia.    The tibia was subluxed anteriorly. PCL retractor was placed as were medial and lateral Hohmann retractors.  We then used the extramedullary tibial cutting guide set at 3° posterior slope for the proximal tibial cut.  5 mm of bone was removed from the low medial side and a centimeter from the high lateral side.  Medial and lateral menisci were then excised as were posterior osteophytes.  Flexion and extension gaps were then checked and with a 9 mm block we achieved full extension and flexion with excellent alignment. The tibia was sized to a 5 tibial tray centered off the medial third of the tibial tubercle.  The tray was pinned in place.  We then impacted the tibial fins.  Size 6 trial femur was then placed and we made the box cut with the reamer  and punch. We trialed with a size 9 poly, 6 femur and 5 tibia.  With these trial components in place we achieved full extension and flexion with excellent alignment and stable throughout.  Lug holes for the femur were drilled.    We then turned our attention to the patella.  Patella was sized to 26 mm in thickness and we made a 9 mm patellar cut with the reciprocal saw.  A 32 trial was placed and the patella drill holes were made.  With the trial button in place there was excellent tracking with the no thumbs technique.    Trial components were then removed.  Final components were opened on the back table.  Posterior capsule was injected with 20 cc of half percent Ropivicaine and 5 mg of morphine.  Cement was mixed on the back table.  The knee was copiously irrigated with Pulsavac lavage.  The knee was thoroughly dried and a bone plug was placed in the distal femoral drill hole.  Cement was then applied to the tibia and final size 5 tibial tray was impacted in place and excess cement was removed.  Cement was applied to the femur and final size 6 femur was impacted in place and excess cement removed.  We then placed a 9 mm poly-this was seen to be fully seated in the tibial tray.  Knee was then placed in extension and we applied cement to the cut patellar surface and 32 patella was held in place with patellar clamp.  All excess cement was removed.  The knee was left in extension while cement cured.    Once the cement was fully cured we irrigated the knee with diluted betadine solution and Pulsavac lavage.  The knee was taken through full range of motion and seen to achieve full extension and flexion with excellent patellar tracking.    We then proceeded with closure.  The deep fascia was closed with a #1 Stratafix barbed suture.  Dermis was closed with 2-0 Vicryl.  Skin was closed with a running 3-0 Stratafix barbed subcuticular suture.  A sterile dressing was then applied with Pirineo mesh dressing and Dermabond.   We then applied 4 x 4's, ABDs, soft roll and a six-inch Ace bandage.    Tourniquet was deflated at 57 minutes.  Patient was transferred to recovery in stable condition.  All sponge and needle counts were correct ×2.  Patient received first dose of TXA just prior to incision and the second dose 5-10 minutes prior to letting down the tourniquet to minimize bleeding.    Postoperative plan:  Patient will be admitted to Dr. STEVENS for medical management  Mobilize starting today with PT/OT as tolerated  Start Aspirin for DVT prophylaxis tomorrow   consult for home physical therapy and home equipment needs  Follow-up with me in 2-3 weeks.    Maikel Mendez MD  12/02/21  13:31 EST      CC: Dr. Marilyn Vermeesch

## 2021-12-02 NOTE — PLAN OF CARE
Problem: Adult Inpatient Plan of Care  Goal: Plan of Care Review  Flowsheets (Taken 12/2/2021 1630)  Progress: improving  Plan of Care Reviewed With: patient  Outcome Summary: PT eval complete. Pt ambulated 200 feet using RW, CGA, and one person to manage equipment. Gait limited by fatigue. Bed mobility performed with supervision and STS with CGA. No knee buckling noted. Pt IND with SLR. Reviewed HEP and knee precautions. PADD score = 9. Recommend pt d/c home with assist and HHPT when appropriate.   Goal Outcome Evaluation:  Plan of Care Reviewed With: patient        Progress: improving  Outcome Summary: PT eval complete. Pt ambulated 200 feet using RW, CGA, and one person to manage equipment. Gait limited by fatigue. Bed mobility performed with supervision and STS with CGA. No knee buckling noted. Pt IND with SLR. Reviewed HEP and knee precautions. PADD score = 9. Recommend pt d/c home with assist and HHPT when appropriate.

## 2021-12-02 NOTE — H&P
Patient Name: Umberto Vigil  MRN: [id number]  : 1955  DOS: 2021    Attending: Maikel Mendez,*    Primary Care Provider: Vermeesch, Marilyn K, MD      Chief complaint: Right knee Pain.    Subjective   Patient is a pleasant 66 y.o. male presented for scheduled surgery by Dr. Mendez.  He underwent right total knee arthroplasty under spinal anesthesia.  Surgery went well and he was admitted for further medical management.  His knee has been painful for 2 years.  He states it is just worn out over time.  He tried steroid injections without lasting benefits.  He has never used a walker or cane for ambulation.  He denies history of DVT or PE.    When seen in PACU, patient is alert.  He has tolerated eating crackers without nausea.  He is still numb from anesthesia and therefore having no pain in his right lower extremity.  Denies shortness of breath or chest pain.    (Above is noted, agree.  Doing well when I saw him in his room afterwards.  No complaints of nausea, vomiting, or shortness of breath.  Pain control is adequate.  He was seen by PT earlier, ambulated 200 feet using rolling walker and contact-guard assist. )wy    Allergies:  No Known Allergies    Meds:  Medications Prior to Admission   Medication Sig Dispense Refill Last Dose   • Indomethacin 20 MG capsule Take  by mouth.   Past Month at Unknown time   • lisinopril (PRINIVIL,ZESTRIL) 20 MG tablet Take 1 tablet by mouth Daily. as directed 90 tablet 3 2021 at 0800   • methylPREDNISolone (MEDROL) 4 MG dose pack Take as directed on package instructions. 1 each 0          History:   Past Medical History:   Diagnosis Date   • Allergy    • Atypical chest pain    • Fracture     h/o   • H/O chest x-ray 2015    Minimal left basilar scar and atelectasis. Otherwise chronic appearing findings. F/U PA and lateral views would be helpful for a more complete evaluation   • H/O renal calculi    • History of PFTs 09/15/2015    No obstruction or  "restrictions, normal diffusion   • Hypertension    • Malignant melanoma (HCC)     h/o   • S/P bronchoscopy 10/14/2015    Left lung was normal. Distortion found in anterior segment of RUL. An endochonchial lavahe was performed. Brushinhs were obtained. Examinamtion was doubtfully normal. Abnormal CT scan of chest   • Squamous cell carcinoma in situ of skin     h/o   • Tinea pedis    • Tinnitus      Past Surgical History:   Procedure Laterality Date   • BRONCHOSCOPY        Status post bronchoscopy 10/14/2015 with pathology and cytology negative for malignancy   • COLONOSCOPY     • HAND SURGERY     • OTHER SURGICAL HISTORY      Biopsy of skin   • SKIN CANCER EXCISION       Family History   Problem Relation Age of Onset   • Cancer Mother         Colon   • Heart attack Father    • Cancer Other    • Heart attack Other      Social History     Tobacco Use   • Smoking status: Former Smoker     Types: Cigarettes     Quit date:      Years since quittin.9   • Smokeless tobacco: Never Used   Substance Use Topics   • Alcohol use: Yes     Alcohol/week: 2.0 - 3.0 standard drinks     Types: 2 - 3 Cans of beer per week     Comment: once every few weeks   • Drug use: No   .  2 children.  .    Review of Systems  Pertinent items are noted in HPI, all other systems reviewed and negative    Vital Signs  /49   Pulse (!) 47   Temp 96.9 °F (36.1 °C) (Temporal)   Resp 12   Ht 180.3 cm (71\")   Wt 79.4 kg (175 lb)   SpO2 95%   BMI 24.41 kg/m²     Physical Exam:    General Appearance:    Alert, cooperative, in no acute distress   Head:    Normocephalic, without obvious abnormality, atraumatic   Eyes:            Lids and lashes normal, conjunctivae and sclerae normal, no   icterus, no pallor, corneas clear    Ears:    Ears appear intact with no abnormalities noted   Throat:   No oral lesions, no thrush, oral mucosa moist   Neck:   No adenopathy, supple, trachea midline, no thyromegaly         Lungs:     Clear " to auscultation,respirations regular, even and                   unlabored    Heart:    Regular rhythm and normal rate, normal S1 and S2, no       murmur, no gallop   Abdomen:     Normal bowel sounds, no masses, no organomegaly, soft        non-tender, non-distended, no guarding, no rebound                 tenderness   Genitalia:    Deferred   Extremities:  RLE, CDI dressing on knee, PNB cath present.    Pulses:   Pulses palpable and equal bilaterally   Skin:   No bleeding, bruising or rash   Neurologic:   Cranial nerves 2 - 12 grossly intact, unable to perform flexion dorsiflexion due to numbness      I reviewed the patient's new clinical results.             Invalid input(s): NEUTOPHILPCT,  EOSPCT        Invalid input(s): LABALBU, PROT  Lab Results   Component Value Date    HGBA1C 5.60 11/18/2021   Results for WILDER WAGNER ARM (MRN [id number]) as of 12/2/2021 14:35   Ref. Range 11/18/2021 13:11   Glucose Latest Ref Range: 65 - 99 mg/dL 95   Sodium Latest Ref Range: 136 - 145 mmol/L 140   Potassium Latest Ref Range: 3.5 - 5.2 mmol/L 4.6   CO2 Latest Ref Range: 22.0 - 29.0 mmol/L 28.0   Chloride Latest Ref Range: 98 - 107 mmol/L 103   Anion Gap Latest Ref Range: 5.0 - 15.0 mmol/L 9.0   Creatinine Latest Ref Range: 0.76 - 1.27 mg/dL 1.02   BUN Latest Ref Range: 8 - 23 mg/dL 24 (H)   BUN/Creatinine Ratio Latest Ref Range: 7.0 - 25.0  23.5   Calcium Latest Ref Range: 8.6 - 10.5 mg/dL 9.8   eGFR Non  Am Latest Ref Range: >60 mL/min/1.73 73   Hemoglobin A1C Latest Ref Range: 4.80 - 5.60 % 5.60   WBC Latest Ref Range: 3.40 - 10.80 10*3/mm3 9.13   RBC Latest Ref Range: 4.14 - 5.80 10*6/mm3 5.01   Hemoglobin Latest Ref Range: 13.0 - 17.7 g/dL 15.7   Hematocrit Latest Ref Range: 37.5 - 51.0 % 46.1   RDW Latest Ref Range: 12.3 - 15.4 % 12.5   MCV Latest Ref Range: 79.0 - 97.0 fL 92.0   MCH Latest Ref Range: 26.6 - 33.0 pg 31.3   MCHC Latest Ref Range: 31.5 - 35.7 g/dL 34.1   MPV Latest Ref Range: 6.0 - 12.0 fL 9.4    Platelets Latest Ref Range: 140 - 450 10*3/mm3 224   RDW-SD Latest Ref Range: 37.0 - 54.0 fl 42.0           Assessment and Plan:       Status post total right knee replacement    Hypertension    Osteoarthritis of right knee    Primary localized osteoarthritis of right knee      Plan  1. PT/OT, Weight bearing as tolerated RLE  2. Pain control-prns, ACB cath with ropivacaine infusion.  3. IS-encourage  4. DVT proph- Mechanicals and aspirin  5. Bowel regimen  6. Resume home medications as appropriate  7. Monitor post-op labs  8. DC planning for home    HTN  - Continue home lisinopril  - Monitor BP   - Holding parameters for BP meds  - Labetalol PRN for SBP>170      Dragon disclaimer:  Part of this encounter note is an electronic transcription/translation of spoken language to printed text. The electronic translation of spoken language may permit erroneous, or at times, nonsensical words or phrases to be inadvertently transcribed; Although I have reviewed the note for such errors, some may still exist.    AMY Cho  12/02/21  14:35 EST    I have personally performed the evaluation on this patient. My history is consistent  with HPI obtained. My exam findings are listed above. I have personally reviewed and discussed the above formulated treatment plan with patient and wife.  Discussed with Ingrid REYES.wy.

## 2021-12-02 NOTE — ANESTHESIA PREPROCEDURE EVALUATION
Anesthesia Evaluation     Patient summary reviewed and Nursing notes reviewed   NPO Solid Status: > 8 hours  NPO Liquid Status: > 8 hours           Airway   Mallampati: II  TM distance: >3 FB  Neck ROM: full  No difficulty expected  Dental - normal exam     Pulmonary - normal exam   Cardiovascular   Exercise tolerance: good (4-7 METS)    Rhythm: regular  Rate: normal        Neuro/Psych  GI/Hepatic/Renal/Endo      Musculoskeletal     Abdominal    Substance History      OB/GYN          Other                      Anesthesia Plan    ASA 2     spinal     intravenous induction     Anesthetic plan, all risks, benefits, and alternatives have been provided, discussed and informed consent has been obtained with: patient.    adductor canal block in pacu

## 2021-12-02 NOTE — BRIEF OP NOTE
TOTAL KNEE ARTHROPLASTY  Progress Note    Umberto Vigil  12/2/2021    Pre-op Diagnosis:   right knee osteoarthritis       Post-Op Diagnosis Codes:     * Primary localized osteoarthritis of right knee [M17.11]    Procedure/CPT® Codes:  [place] TOTAL KNEE ARTHROPLASTY [74152]      Procedure(s):  TOTAL KNEE ARTHROPLASTY RIGHT    Surgeon(s):  Maikel Mendez MD     Asst: LEONIE Garcia    Anesthesia: Spinal, local and adductor canal catheter    Staff:   Circulator: Roxy Montiel RN  Scrub Person: Dewey Vides; Carolina Glass  Vendor Representative: Matthew Hall  Nursing Assistant: Charlie Barron; Cici Baez  Assistant: Dewey Castro RNFA  Assistant: Dewey Castro RNFA      Estimated Blood Loss: 75 mL    Urine Voided: * No values recorded between 12/2/2021 11:50 AM and 12/2/2021  1:17 PM *    Specimens:                None          Drains: * No LDAs found *    Findings: right knee severe tricompartmental degenerative changes    Complications: none    Implants:  Smith and [employer]hew posterior stabilized total knee arthroplasty with a size 6 femur, 5 tibia, 9 polyethylene and 32 patella    Assistant: Dewey Castro RNFA  was responsible for performing the following activities: Retraction, assistance with implantation of components and closure and their skilled assistance was necessary for the success of this case.    Maikel Mendez MD     Date: 12/2/2021  Time: 13:26 EST

## 2021-12-03 VITALS
HEIGHT: 71 IN | BODY MASS INDEX: 24.5 KG/M2 | RESPIRATION RATE: 16 BRPM | WEIGHT: 175 LBS | DIASTOLIC BLOOD PRESSURE: 75 MMHG | SYSTOLIC BLOOD PRESSURE: 120 MMHG | TEMPERATURE: 98 F | OXYGEN SATURATION: 93 % | HEART RATE: 68 BPM

## 2021-12-03 PROBLEM — G89.18 POSTOPERATIVE PAIN: Status: ACTIVE | Noted: 2021-12-03

## 2021-12-03 LAB
ANION GAP SERPL CALCULATED.3IONS-SCNC: 8 MMOL/L (ref 5–15)
BASOPHILS # BLD AUTO: 0.01 10*3/MM3 (ref 0–0.2)
BASOPHILS NFR BLD AUTO: 0.1 % (ref 0–1.5)
BUN SERPL-MCNC: 18 MG/DL (ref 8–23)
BUN/CREAT SERPL: 18.2 (ref 7–25)
CALCIUM SPEC-SCNC: 9.1 MG/DL (ref 8.6–10.5)
CHLORIDE SERPL-SCNC: 105 MMOL/L (ref 98–107)
CO2 SERPL-SCNC: 27 MMOL/L (ref 22–29)
CREAT SERPL-MCNC: 0.99 MG/DL (ref 0.76–1.27)
DEPRECATED RDW RBC AUTO: 41.1 FL (ref 37–54)
EOSINOPHIL # BLD AUTO: 0 10*3/MM3 (ref 0–0.4)
EOSINOPHIL NFR BLD AUTO: 0 % (ref 0.3–6.2)
ERYTHROCYTE [DISTWIDTH] IN BLOOD BY AUTOMATED COUNT: 12.3 % (ref 12.3–15.4)
GFR SERPL CREATININE-BSD FRML MDRD: 75 ML/MIN/1.73
GLUCOSE SERPL-MCNC: 98 MG/DL (ref 65–99)
HCT VFR BLD AUTO: 38.4 % (ref 37.5–51)
HGB BLD-MCNC: 13 G/DL (ref 13–17.7)
IMM GRANULOCYTES # BLD AUTO: 0.08 10*3/MM3 (ref 0–0.05)
IMM GRANULOCYTES NFR BLD AUTO: 0.5 % (ref 0–0.5)
LYMPHOCYTES # BLD AUTO: 1.21 10*3/MM3 (ref 0.7–3.1)
LYMPHOCYTES NFR BLD AUTO: 7.5 % (ref 19.6–45.3)
MCH RBC QN AUTO: 31 PG (ref 26.6–33)
MCHC RBC AUTO-ENTMCNC: 33.9 G/DL (ref 31.5–35.7)
MCV RBC AUTO: 91.6 FL (ref 79–97)
MONOCYTES # BLD AUTO: 1.06 10*3/MM3 (ref 0.1–0.9)
MONOCYTES NFR BLD AUTO: 6.6 % (ref 5–12)
NEUTROPHILS NFR BLD AUTO: 13.78 10*3/MM3 (ref 1.7–7)
NEUTROPHILS NFR BLD AUTO: 85.3 % (ref 42.7–76)
NRBC BLD AUTO-RTO: 0 /100 WBC (ref 0–0.2)
PLATELET # BLD AUTO: 229 10*3/MM3 (ref 140–450)
PMV BLD AUTO: 9.7 FL (ref 6–12)
POTASSIUM SERPL-SCNC: 4.3 MMOL/L (ref 3.5–5.2)
RBC # BLD AUTO: 4.19 10*6/MM3 (ref 4.14–5.8)
SODIUM SERPL-SCNC: 140 MMOL/L (ref 136–145)
WBC NRBC COR # BLD: 16.14 10*3/MM3 (ref 3.4–10.8)

## 2021-12-03 PROCEDURE — 85025 COMPLETE CBC W/AUTO DIFF WBC: CPT | Performed by: ORTHOPAEDIC SURGERY

## 2021-12-03 PROCEDURE — 97116 GAIT TRAINING THERAPY: CPT

## 2021-12-03 PROCEDURE — 97166 OT EVAL MOD COMPLEX 45 MIN: CPT

## 2021-12-03 PROCEDURE — 97110 THERAPEUTIC EXERCISES: CPT

## 2021-12-03 PROCEDURE — 0 CEFAZOLIN IN DEXTROSE 2-4 GM/100ML-% SOLUTION: Performed by: ORTHOPAEDIC SURGERY

## 2021-12-03 PROCEDURE — 80048 BASIC METABOLIC PNL TOTAL CA: CPT | Performed by: ORTHOPAEDIC SURGERY

## 2021-12-03 RX ORDER — DOCUSATE SODIUM 100 MG/1
100 CAPSULE, LIQUID FILLED ORAL 2 TIMES DAILY
Qty: 30 CAPSULE | Refills: 0 | Status: SHIPPED | OUTPATIENT
Start: 2021-12-03 | End: 2021-12-18

## 2021-12-03 RX ORDER — ASPIRIN 325 MG
325 TABLET, DELAYED RELEASE (ENTERIC COATED) ORAL DAILY
Qty: 30 TABLET | Refills: 0 | Status: SHIPPED | OUTPATIENT
Start: 2021-12-04 | End: 2022-01-03

## 2021-12-03 RX ORDER — ACETAMINOPHEN 500 MG
1000 TABLET ORAL EVERY 8 HOURS
Qty: 42 TABLET | Refills: 0
Start: 2021-12-03 | End: 2021-12-10

## 2021-12-03 RX ORDER — MELOXICAM 15 MG/1
15 TABLET ORAL DAILY
Qty: 15 TABLET | Refills: 0 | Status: SHIPPED | OUTPATIENT
Start: 2021-12-03 | End: 2021-12-18

## 2021-12-03 RX ORDER — OXYCODONE HYDROCHLORIDE 5 MG/1
5 TABLET ORAL EVERY 4 HOURS PRN
Qty: 40 TABLET | Refills: 0 | Status: SHIPPED | OUTPATIENT
Start: 2021-12-03 | End: 2022-01-21 | Stop reason: SDUPTHER

## 2021-12-03 RX ADMIN — MELOXICAM 15 MG: 7.5 TABLET ORAL at 10:51

## 2021-12-03 RX ADMIN — ASPIRIN 325 MG: 325 TABLET, COATED ORAL at 10:51

## 2021-12-03 RX ADMIN — ACETAMINOPHEN 1000 MG: 500 TABLET, FILM COATED ORAL at 05:10

## 2021-12-03 RX ADMIN — CEFAZOLIN SODIUM 2 G: 2 INJECTION, SOLUTION INTRAVENOUS at 03:27

## 2021-12-03 NOTE — CASE MANAGEMENT/SOCIAL WORK
Discharge Planning Assessment  [facility]     Patient Name: Umberto Vigil  MRN: [id number]  Today's Date: 12/3/2021    Admit Date: 12/2/2021     Discharge Needs Assessment     Row Name 12/03/21 0915       Living Environment    Lives With spouse    Current Living Arrangements home/apartment/condo    Family Caregiver if Needed spouse    Able to Return to Prior Arrangements yes       Transition Planning    Patient/Family Anticipates Transition to home with family    Transportation Anticipated family or friend will provide       Discharge Needs Assessment    Readmission Within the Last 30 Days no previous admission in last 30 days    Equipment Currently Used at Home none    Equipment Needed After Discharge walker, rolling; commode    Outpatient/Agency/Support Group Needs outpatient therapy               Discharge Plan     Row Name 12/03/21 0917       Plan    Plan home with family    Patient/Family in Agreement with Plan yes    Plan Comments I met with Mr. Vigil at the bedside. [place] wife. He is independent at baseline. He drives himself when leaving the home. He is not current with any outpatient services. He has 3 steps to enter his home. He has a rolling walker, straight cane, toilet riser, and shower chair at home. He anticipates being discharged today home with his wife, whom will transport him via private vehicle. He requests that his outpatient physical therapy be with [facility]. Per Qi with [facility](P:[phone], F:[phone]), they can accept Mr. Vigil as a patient. Mr. Vigil's first appointment will be Monday 12/6/2021 @ 8 am.  Mr. Vigil denies any additional discharge needs.    Final Discharge Disposition Code 01 - home or self-care              Continued Care and Services - Admitted Since 12/2/2021    Coordination has not been started for this encounter.          Demographic Summary    No documentation.                 Functional Status     Row Name 12/03/21 0915       Functional Status, IADL    Medications independent    Meal Preparation independent    Housekeeping independent    Laundry independent    Shopping independent               Psychosocial    No documentation.                Abuse/Neglect    No documentation.                Legal    No documentation.                Substance Abuse    No documentation.                Patient Forms    No documentation.                   Roberth Moralez RN

## 2021-12-03 NOTE — PROGRESS NOTES
"/63 (BP Location: Right arm, Patient Position: Lying)   Pulse 78   Temp 98.2 °F (36.8 °C) (Oral)   Resp 18   Ht 180.3 cm (71\")   Wt 79.4 kg (175 lb)   SpO2 95%   BMI 24.41 kg/m²     Lab Results (last 24 hours)     ** No results found for the last 24 hours. **          Imaging Results (Last 24 Hours)     Procedure Component Value Units Date/Time    XR Knee 1 or 2 View Right [[id number]] Collected: 12/02/21 1434     Updated: 12/02/21 1438    Narrative:      EXAMINATION: XR KNEE 1 OR 2 VW RIGHT-      INDICATION: Post-Op Knee Arthoplasty      COMPARISON: NONE     FINDINGS: Postsurgical findings of interval right total knee  arthroplasty without evidence of immediate hardware complication.  Expected soft tissue edema and subcutaneous emphysema.       Impression:      Postsurgical findings of interval right total knee  arthroplasty without evidence of immediate hardware complication.  Expected soft tissue edema and subcutaneous emphysema.     This report was finalized on 12/2/2021 2:35 PM by Franco Mcrae.             Patient Care Team:  Vermeesch, Marilyn K, MD as PCP - General (Internal Medicine & Pediatrics)    SUBJECTIVE: He reports he is doing well.  Pain is well controlled.  He walked 200 feet with therapy yesterday.  He anticipates going home today.    PHYSICAL EXAM  Right knee incision is clean, dry and intact with mesh dressing in place  Thigh and calf soft and nontender  Neurovascularly intact distally       Status post total right knee replacement    Hypertension    Osteoarthritis of right knee    Primary localized osteoarthritis of right knee      PLAN / DISPOSITION: 66-year-old male postop day #1 status post right total knee arthroplasty  -Continue to mobilize with therapy as tolerated  -Aspirin for DVT prophylaxis  -Okay to shower with mesh dressing in place once nerve catheter removed  -Plan discharge home today.  Schedule outpatient physical therapy to [facility] at [facility] early next week.  -Follow-up in 2 weeks    Maikel Mendez MD  12/03/21  07:00 EST

## 2021-12-03 NOTE — THERAPY TREATMENT NOTE
Patient Name: Umberto Vigil  : 1955    MRN: [id number]                              Today's Date: 12/3/2021       Admit Date: 2021    Visit Dx:     ICD-10-CM ICD-9-CM   1. Status post total right knee replacement  Z96.651 V43.65   2. Primary localized osteoarthritis of right knee  M17.11 715.16     Patient Active Problem List   Diagnosis   • Vitamin D deficiency   • Hypertension   • Gout   • Allergic rhinitis   • Medicare annual wellness visit, initial   • Screening PSA (prostate specific antigen)   • Left hip pain   • Dermatitis   • Osteoarthritis of right knee   • Primary localized osteoarthritis of right knee   • Status post total right knee replacement     Past Medical History:   Diagnosis Date   • Allergy    • Atypical chest pain    • Fracture     h/o   • H/O chest x-ray 2015    Minimal left basilar scar and atelectasis. Otherwise chronic appearing findings. F/U PA and lateral views would be helpful for a more complete evaluation   • H/O renal calculi    • History of PFTs 09/15/2015    No obstruction or restrictions, normal diffusion   • Hypertension    • Malignant melanoma (HCC)     h/o   • S/P bronchoscopy 10/14/2015    Left lung was normal. Distortion found in anterior segment of RUL. An endochonchial lavahe was performed. Brushinhs were obtained. Examinamtion was doubtfully normal. Abnormal CT scan of chest   • Squamous cell carcinoma in situ of skin     h/o   • Tinea pedis    • Tinnitus      Past Surgical History:   Procedure Laterality Date   • BRONCHOSCOPY        Status post bronchoscopy 10/14/2015 with pathology and cytology negative for malignancy   • COLONOSCOPY     • HAND SURGERY     • OTHER SURGICAL HISTORY      Biopsy of skin   • SKIN CANCER EXCISION        General Information     Row Name 21          Physical Therapy Time and Intention    Document Type discharge evaluation/summary  -     Mode of Treatment physical therapy  -HP     Row Name 21           General Information    Patient Profile Reviewed yes  -     Existing Precautions/Restrictions fall  adductor nerve catheter  -     Row Name 12/03/21 0918          Cognition    Orientation Status (Cognition) oriented x 4  -     Row Name 12/03/21 0918          Safety Issues, Functional Mobility    Impairments Affecting Function (Mobility) endurance/activity tolerance  -           User Key  (r) = Recorded By, (t) = Taken By, (c) = Cosigned By    Initials Name Provider Type     Yandy Blanchard PT Physical Therapist               Mobility     Row Name 12/03/21 0918          Bed Mobility    Bed Mobility supine-sit; sit-supine; scooting/bridging  -     Scooting/Bridging [place] (Bed Mobility) independent  -     Supine-Sit [place] (Bed Mobility) independent  -     Sit-Supine [place] (Bed Mobility) independent  -     Comment (Bed Mobility) Pt able to perform bed mobility without difficulty.  -     Row Name 12/03/21 0918          Transfers    Comment (Transfers) Pt performed STS without need for assistance.  -     Row Name 12/03/21 0918          Sit-Stand Transfer    Sit-Stand [place] (Transfers) modified independence  -     Assistive Device (Sit-Stand Transfers) walker, front-wheeled  -     Row Name 12/03/21 0918          Gait/Stairs (Locomotion)    [place] Level (Gait) standby assist  -     Assistive Device (Gait) walker, front-wheeled  -     Distance in Feet (Gait) 350  -     Deviations/Abnormal Patterns (Gait) bilateral deviations; carmenza decreased; gait speed decreased; stride length decreased  -     Bilateral Gait Deviations forward flexed posture  -     Right Sided Gait Deviations heel strike decreased; weight shift ability decreased  -     [place] Level (Stairs) contact guard; verbal cues  -     Assistive Device (Stairs) cane, straight  -     Handrail Location (Stairs) left side (ascending)  -     Number of Steps (Stairs) 4  -     Ascending  Technique (Stairs) step-to-step  -     Descending Technique (Stairs) step-to-step  -     Stairs, Safety Issues sequencing ability decreased  -     Comment (Gait/Stairs) pt amb 350 with step through gait pattern and decreased gait speed. VC for increased stride length and WB through RLE. Pt demonstrated 2 episodes of knee buckling while walking with no LOB noted. Pt educated on use of FWW at home for safety. pt navigated 4 steps with CGA for safety only, SPC and VC for sequencing.  -     Row Name 12/03/21 0918          Mobility    Extremity Weight-bearing Status right lower extremity  -     Right Lower Extremity (Weight-bearing Status) weight-bearing as tolerated (WBAT)  -           User Key  (r) = Recorded By, (t) = Taken By, (c) = Cosigned By    Initials Name Provider Type     Yandy Blanchard, PT Physical Therapist               Obj/Interventions     Row Name 12/03/21 0923          Range of Motion Comprehensive    General Range of Motion lower extremity range of motion deficits identified  -     Comment, General Range of Motion R knee AROM 0-4-115  -     Row Name 12/03/21 0923          Motor Skills    Motor Skills therapeutic exercise  -     Therapeutic Exercise hip; knee; ankle  -     Row Name 12/03/21 0923          Hip (Therapeutic Exercise)    Hip (Therapeutic Exercise) strengthening exercise  -     Hip Strengthening (Therapeutic Exercise) right; heel slides; 10 repetitions  -     Row Name 12/03/21 0923          Knee (Therapeutic Exercise)    Knee (Therapeutic Exercise) strengthening exercise; isometric exercises  -     Knee Isometrics (Therapeutic Exercise) right; gluteal sets; quad sets; 10 repetitions  -     Knee Strengthening (Therapeutic Exercise) right; SLR (straight leg raise); SAQ (short arc quad); LAQ (long arc quad); heel slides; sitting; 10 repetitions  -     Row Name 12/03/21 0923          Ankle (Therapeutic Exercise)    Ankle (Therapeutic Exercise) AROM (active range  of motion)  -     Ankle AROM (Therapeutic Exercise) bilateral; dorsiflexion; plantarflexion; 10 repetitions  -     Row Name 12/03/21 0923          Balance    Balance Assessment sitting static balance; sitting dynamic balance; standing static balance; standing dynamic balance  -HP     Static Sitting Balance WFL; sitting, edge of bed  -HP     Dynamic Sitting Balance WFL; sitting, edge of bed  -HP     Static Standing Balance WFL; supported  -HP     Dynamic Standing Balance WFL; supported  -HP     Balance Interventions occupation based/functional task; sitting; standing; sit to stand  -     Comment, Balance use FWW due to knee buckling without LOB  -HP           User Key  (r) = Recorded By, (t) = Taken By, (c) = Cosigned By    Initials Name Provider Type     Yandy Blanchard, PT Physical Therapist               Goals/Plan     Row Name 12/03/21 0927          Bed Mobility Goal 1 (PT)    Activity/Assistive Device (Bed Mobility Goal 1, PT) sit to supine/supine to sit  -     [place] Level/Cues Needed (Bed Mobility Goal 1, PT) modified independence  -HP     Time Frame (Bed Mobility Goal 1, PT) long term goal (LTG); 3 days  -HP     Progress/Outcomes (Bed Mobility Goal 1, PT) goal met  -     Row Name 12/03/21 0927          Transfer Goal 1 (PT)    Activity/Assistive Device (Transfer Goal 1, PT) sit-to-stand/stand-to-sit; walker, rolling  -HP     [place] Level/Cues Needed (Transfer Goal 1, PT) modified independence  -HP     Time Frame (Transfer Goal 1, PT) long term goal (LTG); 3 days  -HP     Progress/Outcome (Transfer Goal 1, PT) goal met  -     Row Name 12/03/21 0927          Gait Training Goal 1 (PT)    Activity/Assistive Device (Gait Training Goal 1, PT) gait (walking locomotion); walker, rolling  -HP     [place] Level (Gait Training Goal 1, PT) modified independence  -HP     Distance (Gait Training Goal 1, PT) 300 feet  -HP     Time Frame (Gait Training Goal 1, PT) long term goal (LTG); 3 days   -HP     Progress/Outcome (Gait Training Goal 1, PT) goal partially met  -     Row Name 12/03/21 0927          ROM Goal 1 (PT)    ROM Goal 1 (PT) R knee AROM 0-90 degrees  -HP     Time Frame (ROM Goal 1, PT) long-term goal (LTG); 3 days  -HP     Progress/Outcome (ROM Goal 1, PT) goal partially met  -     Row Name 12/03/21 0927          Stairs Goal 1 (PT)    Activity/Assistive Device (Stairs Goal 1, PT) stairs, all skills; using handrail, left; cane, straight  -     [place] Level/Cues Needed (Stairs Goal 1, PT) contact guard assist  -HP     Time Frame (Stairs Goal 1, PT) long term goal (LTG); 3 days  -HP     Progress/Outcome (Stairs Goal 1, PT) goal met  -           User Key  (r) = Recorded By, (t) = Taken By, (c) = Cosigned By    Initials Name Provider Type     Yandy Blanchard, PT Physical Therapist               Clinical Impression     Row Name 12/03/21 0924          Pain    Additional Documentation Pain Scale: Numbers Pre/Post-Treatment (Group)  -     Row Name 12/03/21 0924          Pain Scale: Numbers Pre/Post-Treatment    Pretreatment Pain Rating 0/10 - no pain  -     Posttreatment Pain Rating 3/10  -HP     Pain Location - Side Right  -     Pain Location - Orientation anterior  -     Pain Location knee  -HP     Pre/Posttreatment Pain Comment tolerable  -     Pain Intervention(s) Cold applied; Repositioned; Ambulation/increased activity  -     Row Name 12/03/21 0924          Plan of Care Review    Plan of Care Reviewed With patient  -     Progress improving  -     Outcome Summary Pt able to perform bed mobility Ind and STS with FWW Mod I. Pt ambulate 350' with FWW and CGA for safety with two episodes of knee buckling with no pain or LOB noted. Pt navigated 4 steps with L HR, SPC, and CGA for safety only. Recommend discharge home with spouse and HHPT when medically appropriate.  -     Row Name 12/03/21 0924          Vital Signs    Pre Systolic BP Rehab --  VSS; RN cleared for therapy   -HP     Pre Patient Position Sitting  -HP     Intra Patient Position Standing  -HP     Post Patient Position Sitting  -HP     Row Name 12/03/21 0924          Positioning and Restraints    Pre-Treatment Position sitting in chair/recliner  -HP     Post Treatment Position chair  -HP     In Chair notified nsg; reclined; sitting; call light within reach; encouraged to call for assist; with other staff  nerve cath intact; exit alarm unchanged  -HP           User Key  (r) = Recorded By, (t) = Taken By, (c) = Cosigned By    Initials Name Provider Type    Yandy Rutledge PT Physical Therapist               Outcome Measures     Row Name 12/03/21 0932          How much help from another person do you currently need...    Turning from your back to your side while in flat bed without using bedrails? 4  -HP     Moving from lying on back to sitting on the side of a flat bed without bedrails? 4  -HP     Moving to and from a bed to a chair (including a wheelchair)? 4  -HP     Standing up from a chair using your arms (e.g., wheelchair, bedside chair)? 4  -HP     Climbing 3-5 steps with a railing? 3  -HP     To walk in hospital room? 4  -HP     AM-PAC 6 Clicks Score (PT) 23  -HP     Row Name 12/03/21 0932 12/03/21 0750       Functional Assessment    Outcome Measure Options AM-PAC 6 Clicks Basic Mobility (PT)  -HP AM-PAC 6 Clicks Daily Activity (OT)  -TA          User Key  (r) = Recorded By, (t) = Taken By, (c) = Cosigned By    Initials Name Provider Type    Rubens Berumen OT Occupational Therapist    Yandy Rutledge, PT Physical Therapist                             Physical Therapy Education                 Title: PT OT SLP Therapies (Done)     Topic: Physical Therapy (Done)     Point: Mobility training (Done)     Learning Progress Summary           Patient Acceptance, E,D,H, VU,DU by JESSIE at 12/3/2021 0932    Acceptance, E,D, VU by NOEMY at 12/2/2021 1630    Comment: Educated on safe sequencing with bed mobility,  ambulatory transfers, and gait. Reviewed HEP and knee precautions.                   Point: Home exercise program (Done)     Learning Progress Summary           Patient Acceptance, E,D,H, VU,DU by  at 12/3/2021 0932    Acceptance, E,D, VU by  at 12/2/2021 1630    Comment: Educated on safe sequencing with bed mobility, ambulatory transfers, and gait. Reviewed HEP and knee precautions.                   Point: Body mechanics (Done)     Learning Progress Summary           Patient Acceptance, E,D,H, VU,DU by  at 12/3/2021 0932    Acceptance, E,D, VU by  at 12/2/2021 1630    Comment: Educated on safe sequencing with bed mobility, ambulatory transfers, and gait. Reviewed HEP and knee precautions.                   Point: Precautions (Done)     Learning Progress Summary           Patient Acceptance, E,D,H, VU,DU by  at 12/3/2021 0932    Acceptance, E,D, VU by  at 12/2/2021 1630    Comment: Educated on safe sequencing with bed mobility, ambulatory transfers, and gait. Reviewed HEP and knee precautions.                               User Key     Initials Effective Dates Name Provider Type Discipline     06/16/21 -  Boone Mackay, PT Physical Therapist PT     06/01/21 -  Yandy Blanchard, PT Physical Therapist PT              PT Recommendation and Plan     Plan of Care Reviewed With: patient  Progress: improving  Outcome Summary: Pt able to perform bed mobility Ind and STS with FWW Mod I. Pt ambulate 350' with FWW and CGA for safety with two episodes of knee buckling with no pain or LOB noted. Pt navigated 4 steps with L HR, SPC, and CGA for safety only. Recommend discharge home with spouse and HHPT when medically appropriate.     Time Calculation:    PT Charges     Row Name 12/03/21 0827             Time Calculation    Start Time 0827  -      PT Received On 12/03/21  -              Time Calculation- PT    Total Timed Code Minutes- PT 44 minute(s)  -              Timed Charges    41617 - PT Therapeutic  Exercise Minutes 20  -HP      66417 - Gait Training Minutes  20  -HP      21922 - PT Therapeutic Activity Minutes 4  -HP              Total Minutes    Timed Charges Total Minutes 44  -HP       Total Minutes 44  -HP            User Key  (r) = Recorded By, (t) = Taken By, (c) = Cosigned By    Initials Name Provider Type    HP Yandy Blanchard PT Physical Therapist              Therapy Charges for Today     Code Description Service Date Service Provider Modifiers Qty    25080557499 HC PT THER PROC EA 15 MIN 12/3/2021 Yandy Blanchard, PT GP 1    74105442243 HC GAIT TRAINING EA 15 MIN 12/3/2021 Yandy Blanchard, PT GP 2          PT G-Codes  Outcome Measure Options: AM-PAC 6 Clicks Basic Mobility (PT)  AM-PAC 6 Clicks Score (PT): 23  AM-PAC 6 Clicks Score (OT): 24    Yandy Blanchard PT  12/3/2021

## 2021-12-03 NOTE — PLAN OF CARE
Problem: Adult Inpatient Plan of Care  Goal: Plan of Care Review  Outcome: Ongoing, Progressing  Flowsheets (Taken 12/3/2021 0430)  Progress: improving  Plan of Care Reviewed With: patient  Goal: Patient-Specific Goal (Individualized)  Outcome: Ongoing, Progressing  Goal: Absence of Hospital-Acquired Illness or Injury  Outcome: Ongoing, Progressing  Intervention: Identify and Manage Fall Risk  Recent Flowsheet Documentation  Taken 12/3/2021 0400 by Daniela Nunes RN  Safety Promotion/Fall Prevention:   activity supervised   assistive device/personal items within reach   safety round/check completed  Taken 12/3/2021 0200 by Daniela Nunes RN  Safety Promotion/Fall Prevention:   activity supervised   assistive device/personal items within reach   safety round/check completed  Taken 12/3/2021 0000 by Daniela Nunes RN  Safety Promotion/Fall Prevention:   activity supervised   assistive device/personal items within reach   safety round/check completed  Taken 12/2/2021 2200 by Daniela Nunes RN  Safety Promotion/Fall Prevention:   activity supervised   assistive device/personal items within reach   safety round/check completed  Taken 12/2/2021 2000 by Daniela Nunes RN  Safety Promotion/Fall Prevention:   activity supervised   assistive device/personal items within reach   clutter free environment maintained   fall prevention program maintained   lighting adjusted   muscle strengthening facilitated   nonskid shoes/slippers when out of bed   room organization consistent   safety round/check completed  Intervention: Prevent Skin Injury  Recent Flowsheet Documentation  Taken 12/3/2021 0400 by Daniela Nunes RN  Body Position:   supine   position changed independently  Taken 12/3/2021 0200 by Daniela Nunes RN  Body Position: position changed independently  Taken 12/3/2021 0000 by Daniela Nunes RN  Body Position: supine  Taken 12/2/2021 2200 by Daniela Nunes RN  Body Position: position maintained  Taken  12/2/2021 2000 by Daniela Nunes RN  Body Position: supine  Intervention: Prevent and Manage VTE (venous thromboembolism) Risk  Recent Flowsheet Documentation  Taken 12/3/2021 0400 by Daniela Nunes RN  VTE Prevention/Management:   bilateral   sequential compression devices on  Taken 12/3/2021 0200 by Daniela Nunes RN  VTE Prevention/Management:   bilateral   sequential compression devices on  Taken 12/3/2021 0000 by Daniela Nunes RN  VTE Prevention/Management:   bilateral   sequential compression devices on  Taken 12/2/2021 2200 by Daniela Nunes RN  VTE Prevention/Management:   bilateral   sequential compression devices on  Taken 12/2/2021 2000 by Daniela Nunes RN  VTE Prevention/Management:   bilateral   sequential compression devices on  Intervention: Prevent Infection  Recent Flowsheet Documentation  Taken 12/3/2021 0400 by Daniela Nunes RN  Infection Prevention:   hand hygiene promoted   rest/sleep promoted  Taken 12/3/2021 0200 by Daniela Nunes RN  Infection Prevention:   hand hygiene promoted   rest/sleep promoted  Taken 12/3/2021 0000 by Daniela Nunes RN  Infection Prevention:   hand hygiene promoted   rest/sleep promoted  Taken 12/2/2021 2200 by Daniela Nunes RN  Infection Prevention:   hand hygiene promoted   rest/sleep promoted  Taken 12/2/2021 2000 by Daniela Nunes RN  Infection Prevention:   hand hygiene promoted   rest/sleep promoted  Goal: Optimal Comfort and Wellbeing  Outcome: Ongoing, Progressing  Intervention: Provide Person-Centered Care  Recent Flowsheet Documentation  Taken 12/3/2021 0400 by Daniela Nunes RN  Trust Relationship/Rapport: care explained  Taken 12/3/2021 0200 by Daniela Nunes RN  Trust Relationship/Rapport: care explained  Taken 12/3/2021 0000 by Daniela Nunes RN  Trust Relationship/Rapport: care explained  Taken 12/2/2021 2200 by Daniela Nunes RN  Trust Relationship/Rapport: care explained  Taken 12/2/2021 2000 by Daniela Nunes  RN  Trust Relationship/Rapport:   care explained   choices provided   questions answered   questions encouraged   reassurance provided  Goal: Readiness for Transition of Care  Outcome: Ongoing, Progressing     Problem: Hypertension Comorbidity  Goal: Blood Pressure in Desired Range  Outcome: Ongoing, Progressing  Intervention: Maintain Hypertension-Management Strategies  Recent Flowsheet Documentation  Taken 12/2/2021 2000 by Daniela Nunes RN  Medication Review/Management: medications reviewed     Problem: Adjustment to Decreased Mobility and [place] (Orthopaedic Rehabilitation)  Goal: Optimal Coping  Outcome: Ongoing, Progressing  Intervention: Support Psychosocial Response to Injury  Recent Flowsheet Documentation  Taken 12/2/2021 2000 by Daniela Nunes RN  Family/Support System Care:   self-care encouraged   support provided     Problem: BADL (Basic Activity of Daily Living) Impairment (Orthopaedic Rehabilitation)  Goal: Optimal Safe BADL Performance  Outcome: Ongoing, Progressing     Problem: Bowel Elimination Impairment (Orthopaedic Rehabilitation)  Goal: Effective Bowel Elimination  Outcome: Ongoing, Progressing     Problem: IADL (Instrumental Activity of Daily Living) Impairment (Orthopaedic Rehabilitation)  Goal: Optimal Safe IADL Performance  Outcome: Ongoing, Progressing     Problem: Infection (Orthopaedic Rehabilitation)  Goal: Absence of Infection Signs/Symptoms  Outcome: Ongoing, Progressing     Problem: Mobility Impairment (Orthopaedic Rehabilitation)  Goal: Optimal Mobility [place] and Safety  Outcome: Ongoing, Progressing     Problem: Fall Injury Risk  Goal: Absence of Fall and Fall-Related Injury  Outcome: Ongoing, Progressing  Intervention: Identify and Manage Contributors to Fall Injury Risk  Recent Flowsheet Documentation  Taken 12/2/2021 2000 by Daniela Nunes RN  Medication Review/Management: medications reviewed  Intervention: Promote Injury-Free Environment  Recent Flowsheet  Documentation  Taken 12/3/2021 0400 by Daniela Nunes RN  Safety Promotion/Fall Prevention:   activity supervised   assistive device/personal items within reach   safety round/check completed  Taken 12/3/2021 0200 by Daniela Nunes RN  Safety Promotion/Fall Prevention:   activity supervised   assistive device/personal items within reach   safety round/check completed  Taken 12/3/2021 0000 by Daniela Nunes RN  Safety Promotion/Fall Prevention:   activity supervised   assistive device/personal items within reach   safety round/check completed  Taken 12/2/2021 2200 by Daniela Nunes RN  Safety Promotion/Fall Prevention:   activity supervised   assistive device/personal items within reach   safety round/check completed  Taken 12/2/2021 2000 by Daniela Nunes RN  Safety Promotion/Fall Prevention:   activity supervised   assistive device/personal items within reach   clutter free environment maintained   fall prevention program maintained   lighting adjusted   muscle strengthening facilitated   nonskid shoes/slippers when out of bed   room organization consistent   safety round/check completed   Goal Outcome Evaluation:  Plan of Care Reviewed With: patient      Pt currently in bed resting quietly. No complaints of pain or discomfort at this time. Pt ambulatory on the unit. Voiding well. Vitals WNL. Ropivicaine at 10ml. Neuro checks WNL. No other observations at this time. Will continue to monitor, call bell in reach.  Progress: improving

## 2021-12-03 NOTE — PLAN OF CARE
Problem: Adult Inpatient Plan of Care  Goal: Plan of Care Review  Recent Flowsheet Documentation  Taken 12/3/2021 0750 by Rubens Llamas, OT  Progress: improving  Plan of Care Reviewed With: patient  Outcome Summary:   VSS   Pt presents at/near fxl baseline with ADL performance, fxl  mobility for ADLs. Pt issued LH sponge for LBB and demonstrated independence with use. No DME needs identified. No further acute skilled OT services indicated at this time. Recommend home with spouse and OP services at discharge.   Goal Outcome Evaluation:  Plan of Care Reviewed With: patient        Progress: improving  Outcome Summary: VSS; Pt presents at/near fxl baseline with ADL performance, fxl  mobility for ADLs. Pt issued LH sponge for LBB and demonstrated independence with use. No DME needs identified. No further acute skilled OT services indicated at this time. Recommend home with spouse and OP services at discharge.

## 2021-12-03 NOTE — THERAPY DISCHARGE NOTE
Patient Name: Umberto Vigil  : 1955    MRN: [id number]                              Today's Date: 12/3/2021       Admit Date: 2021    Visit Dx:     ICD-10-CM ICD-9-CM   1. Status post total right knee replacement  Z96.651 V43.65   2. Primary localized osteoarthritis of right knee  M17.11 715.16     Patient Active Problem List   Diagnosis   • Vitamin D deficiency   • Hypertension   • Gout   • Allergic rhinitis   • Medicare annual wellness visit, initial   • Screening PSA (prostate specific antigen)   • Left hip pain   • Dermatitis   • Osteoarthritis of right knee   • Primary localized osteoarthritis of right knee   • Status post total right knee replacement     Past Medical History:   Diagnosis Date   • Allergy    • Atypical chest pain    • Fracture     h/o   • H/O chest x-ray 2015    Minimal left basilar scar and atelectasis. Otherwise chronic appearing findings. F/U PA and lateral views would be helpful for a more complete evaluation   • H/O renal calculi    • History of PFTs 09/15/2015    No obstruction or restrictions, normal diffusion   • Hypertension    • Malignant melanoma (HCC)     h/o   • S/P bronchoscopy 10/14/2015    Left lung was normal. Distortion found in anterior segment of RUL. An endochonchial lavahe was performed. Brushinhs were obtained. Examinamtion was doubtfully normal. Abnormal CT scan of chest   • Squamous cell carcinoma in situ of skin     h/o   • Tinea pedis    • Tinnitus      Past Surgical History:   Procedure Laterality Date   • BRONCHOSCOPY        Status post bronchoscopy 10/14/2015 with pathology and cytology negative for malignancy   • COLONOSCOPY     • HAND SURGERY     • OTHER SURGICAL HISTORY      Biopsy of skin   • SKIN CANCER EXCISION        General Information     Row Name 21          Physical Therapy Time and Intention    Document Type discharge evaluation/summary  -     Mode of Treatment physical therapy  -HP     Row Name 21           General Information    Patient Profile Reviewed yes  -     Existing Precautions/Restrictions fall  adductor nerve catheter  -     Row Name 12/03/21 0918          Cognition    Orientation Status (Cognition) oriented x 4  -     Row Name 12/03/21 0918          Safety Issues, Functional Mobility    Impairments Affecting Function (Mobility) endurance/activity tolerance  -           User Key  (r) = Recorded By, (t) = Taken By, (c) = Cosigned By    Initials Name Provider Type     Yandy Blanchard PT Physical Therapist               Mobility     Row Name 12/03/21 0918          Bed Mobility    Bed Mobility supine-sit; sit-supine; scooting/bridging  -     Scooting/Bridging [place] (Bed Mobility) independent  -     Supine-Sit [place] (Bed Mobility) independent  -     Sit-Supine [place] (Bed Mobility) independent  -     Comment (Bed Mobility) Pt able to perform bed mobility without difficulty.  -     Row Name 12/03/21 0918          Transfers    Comment (Transfers) Pt performed STS without need for assistance.  -     Row Name 12/03/21 0918          Sit-Stand Transfer    Sit-Stand [place] (Transfers) modified independence  -     Assistive Device (Sit-Stand Transfers) walker, front-wheeled  -     Row Name 12/03/21 0918          Gait/Stairs (Locomotion)    [place] Level (Gait) standby assist  -     Assistive Device (Gait) walker, front-wheeled  -     Distance in Feet (Gait) 350  -     Deviations/Abnormal Patterns (Gait) bilateral deviations; carmenza decreased; gait speed decreased; stride length decreased  -     Bilateral Gait Deviations forward flexed posture  -     Right Sided Gait Deviations heel strike decreased; weight shift ability decreased  -     [place] Level (Stairs) contact guard; verbal cues  -     Assistive Device (Stairs) cane, straight  -     Handrail Location (Stairs) left side (ascending)  -     Number of Steps (Stairs) 4  -     Ascending  Technique (Stairs) step-to-step  -     Descending Technique (Stairs) step-to-step  -     Stairs, Safety Issues sequencing ability decreased  -     Comment (Gait/Stairs) pt amb 350 with step through gait pattern and decreased gait speed. VC for increased stride length and WB through RLE. Pt demonstrated 2 episodes of knee buckling while walking with no LOB noted. Pt educated on use of FWW at home for safety. pt navigated 4 steps with CGA for safety only, SPC and VC for sequencing.  -     Row Name 12/03/21 0918          Mobility    Extremity Weight-bearing Status right lower extremity  -     Right Lower Extremity (Weight-bearing Status) weight-bearing as tolerated (WBAT)  -           User Key  (r) = Recorded By, (t) = Taken By, (c) = Cosigned By    Initials Name Provider Type     Yanyd Blacnhard, PT Physical Therapist               Obj/Interventions     Row Name 12/03/21 0923          Range of Motion Comprehensive    General Range of Motion lower extremity range of motion deficits identified  -     Comment, General Range of Motion R knee AROM 0-4-115  -     Row Name 12/03/21 0923          Motor Skills    Motor Skills therapeutic exercise  -     Therapeutic Exercise hip; knee; ankle  -     Row Name 12/03/21 0923          Hip (Therapeutic Exercise)    Hip (Therapeutic Exercise) strengthening exercise  -     Hip Strengthening (Therapeutic Exercise) right; heel slides; 10 repetitions  -     Row Name 12/03/21 0923          Knee (Therapeutic Exercise)    Knee (Therapeutic Exercise) strengthening exercise; isometric exercises  -     Knee Isometrics (Therapeutic Exercise) right; gluteal sets; quad sets; 10 repetitions  -     Knee Strengthening (Therapeutic Exercise) right; SLR (straight leg raise); SAQ (short arc quad); LAQ (long arc quad); heel slides; sitting; 10 repetitions  -     Row Name 12/03/21 0923          Ankle (Therapeutic Exercise)    Ankle (Therapeutic Exercise) AROM (active range  of motion)  -     Ankle AROM (Therapeutic Exercise) bilateral; dorsiflexion; plantarflexion; 10 repetitions  -     Row Name 12/03/21 0923          Balance    Balance Assessment sitting static balance; sitting dynamic balance; standing static balance; standing dynamic balance  -HP     Static Sitting Balance WFL; sitting, edge of bed  -HP     Dynamic Sitting Balance WFL; sitting, edge of bed  -HP     Static Standing Balance WFL; supported  -HP     Dynamic Standing Balance WFL; supported  -HP     Balance Interventions occupation based/functional task; sitting; standing; sit to stand  -     Comment, Balance use FWW due to knee buckling without LOB  -HP           User Key  (r) = Recorded By, (t) = Taken By, (c) = Cosigned By    Initials Name Provider Type     Yandy Blanchard, PT Physical Therapist               Goals/Plan     Row Name 12/03/21 0927          Bed Mobility Goal 1 (PT)    Activity/Assistive Device (Bed Mobility Goal 1, PT) sit to supine/supine to sit  -     [place] Level/Cues Needed (Bed Mobility Goal 1, PT) modified independence  -HP     Time Frame (Bed Mobility Goal 1, PT) long term goal (LTG); 3 days  -HP     Progress/Outcomes (Bed Mobility Goal 1, PT) goal met  -     Row Name 12/03/21 0927          Transfer Goal 1 (PT)    Activity/Assistive Device (Transfer Goal 1, PT) sit-to-stand/stand-to-sit; walker, rolling  -HP     [place] Level/Cues Needed (Transfer Goal 1, PT) modified independence  -HP     Time Frame (Transfer Goal 1, PT) long term goal (LTG); 3 days  -HP     Progress/Outcome (Transfer Goal 1, PT) goal met  -     Row Name 12/03/21 0927          Gait Training Goal 1 (PT)    Activity/Assistive Device (Gait Training Goal 1, PT) gait (walking locomotion); walker, rolling  -HP     [place] Level (Gait Training Goal 1, PT) modified independence  -HP     Distance (Gait Training Goal 1, PT) 300 feet  -HP     Time Frame (Gait Training Goal 1, PT) long term goal (LTG); 3 days   -HP     Progress/Outcome (Gait Training Goal 1, PT) goal partially met  -     Row Name 12/03/21 0927          ROM Goal 1 (PT)    ROM Goal 1 (PT) R knee AROM 0-90 degrees  -HP     Time Frame (ROM Goal 1, PT) long-term goal (LTG); 3 days  -HP     Progress/Outcome (ROM Goal 1, PT) goal partially met  -     Row Name 12/03/21 0927          Stairs Goal 1 (PT)    Activity/Assistive Device (Stairs Goal 1, PT) stairs, all skills; using handrail, left; cane, straight  -     [place] Level/Cues Needed (Stairs Goal 1, PT) contact guard assist  -HP     Time Frame (Stairs Goal 1, PT) long term goal (LTG); 3 days  -HP     Progress/Outcome (Stairs Goal 1, PT) goal met  -           User Key  (r) = Recorded By, (t) = Taken By, (c) = Cosigned By    Initials Name Provider Type     Yandy Blanchard, PT Physical Therapist               Clinical Impression     Row Name 12/03/21 0924          Pain    Additional Documentation Pain Scale: Numbers Pre/Post-Treatment (Group)  -     Row Name 12/03/21 0924          Pain Scale: Numbers Pre/Post-Treatment    Pretreatment Pain Rating 0/10 - no pain  -     Posttreatment Pain Rating 3/10  -HP     Pain Location - Side Right  -     Pain Location - Orientation anterior  -     Pain Location knee  -HP     Pre/Posttreatment Pain Comment tolerable  -     Pain Intervention(s) Cold applied; Repositioned; Ambulation/increased activity  -     Row Name 12/03/21 0924          Plan of Care Review    Plan of Care Reviewed With patient  -     Progress improving  -     Outcome Summary Pt able to perform bed mobility Ind and STS with FWW Mod I. Pt ambulate 350' with FWW and CGA for safety with two episodes of knee buckling with no pain or LOB noted. Pt navigated 4 steps with L HR, SPC, and CGA for safety only. Recommend discharge home with spouse and HHPT when medically appropriate.  -     Row Name 12/03/21 0924          Vital Signs    Pre Systolic BP Rehab --  VSS; RN cleared for therapy   -HP     Pre Patient Position Sitting  -HP     Intra Patient Position Standing  -HP     Post Patient Position Sitting  -HP     Row Name 12/03/21 0924          Positioning and Restraints    Pre-Treatment Position sitting in chair/recliner  -HP     Post Treatment Position chair  -HP     In Chair notified nsg; reclined; sitting; call light within reach; encouraged to call for assist; with other staff  nerve cath intact; exit alarm unchanged  -HP           User Key  (r) = Recorded By, (t) = Taken By, (c) = Cosigned By    Initials Name Provider Type    Yandy Rutledge PT Physical Therapist               Outcome Measures     Row Name 12/03/21 0932          How much help from another person do you currently need...    Turning from your back to your side while in flat bed without using bedrails? 4  -HP     Moving from lying on back to sitting on the side of a flat bed without bedrails? 4  -HP     Moving to and from a bed to a chair (including a wheelchair)? 4  -HP     Standing up from a chair using your arms (e.g., wheelchair, bedside chair)? 4  -HP     Climbing 3-5 steps with a railing? 3  -HP     To walk in hospital room? 4  -HP     AM-PAC 6 Clicks Score (PT) 23  -HP     Row Name 12/03/21 0932 12/03/21 0750       Functional Assessment    Outcome Measure Options AM-PAC 6 Clicks Basic Mobility (PT)  -HP AM-PAC 6 Clicks Daily Activity (OT)  -TA          User Key  (r) = Recorded By, (t) = Taken By, (c) = Cosigned By    Initials Name Provider Type    Rubens Berumen OT Occupational Therapist    Yandy Rutledge, PT Physical Therapist              Physical Therapy Education                 Title: PT OT SLP Therapies (Done)     Topic: Physical Therapy (Done)     Point: Mobility training (Done)     Learning Progress Summary           Patient Acceptance, E,D,H, VU,DU by JESSIE at 12/3/2021 0932    Acceptance, E,D, VU by NOEMY at 12/2/2021 1630    Comment: Educated on safe sequencing with bed mobility, ambulatory transfers,  and gait. Reviewed HEP and knee precautions.                   Point: Home exercise program (Done)     Learning Progress Summary           Patient Acceptance, E,D,H, VU,DU by  at 12/3/2021 0932    Acceptance, E,D, VU by  at 12/2/2021 1630    Comment: Educated on safe sequencing with bed mobility, ambulatory transfers, and gait. Reviewed HEP and knee precautions.                   Point: Body mechanics (Done)     Learning Progress Summary           Patient Acceptance, E,D,H, VU,DU by  at 12/3/2021 0932    Acceptance, E,D, VU by  at 12/2/2021 1630    Comment: Educated on safe sequencing with bed mobility, ambulatory transfers, and gait. Reviewed HEP and knee precautions.                   Point: Precautions (Done)     Learning Progress Summary           Patient Acceptance, E,D,H, VU,DU by  at 12/3/2021 0932    Acceptance, E,D, VU by  at 12/2/2021 1630    Comment: Educated on safe sequencing with bed mobility, ambulatory transfers, and gait. Reviewed HEP and knee precautions.                               User Key     Initials Effective Dates Name Provider Type Discipline     06/16/21 -  Boone Mackay, PT Physical Therapist PT     06/01/21 -  Yandy Blanchard, PT Physical Therapist PT              PT Recommendation and Plan     Plan of Care Reviewed With: patient  Progress: improving  Outcome Summary: Pt able to perform bed mobility Ind and STS with FWW Mod I. Pt ambulate 350' with FWW and CGA for safety with two episodes of knee buckling with no pain or LOB noted. Pt navigated 4 steps with L HR, SPC, and CGA for safety only. Recommend discharge home with spouse and HHPT when medically appropriate.     Time Calculation:    PT Charges     Row Name 12/03/21 0827             Time Calculation    Start Time 0827  -      PT Received On 12/03/21  -              Time Calculation- PT    Total Timed Code Minutes- PT 44 minute(s)  -              Timed Charges    28990 - PT Therapeutic Exercise Minutes 20  -       65287 - Gait Training Minutes  20  -HP      36128 - PT Therapeutic Activity Minutes 4  -HP              Total Minutes    Timed Charges Total Minutes 44  -HP       Total Minutes 44  -HP            User Key  (r) = Recorded By, (t) = Taken By, (c) = Cosigned By    Initials Name Provider Type    HP Yandy Blanchard, PT Physical Therapist              Therapy Charges for Today     Code Description Service Date Service Provider Modifiers Qty    17137051406 HC PT THER PROC EA 15 MIN 12/3/2021 Yandy Blanchard, PT GP 1    62279519505 HC GAIT TRAINING EA 15 MIN 12/3/2021 Yandy Blanchard, PT GP 2          PT G-Codes  Outcome Measure Options: AM-PAC 6 Clicks Basic Mobility (PT)  AM-PAC 6 Clicks Score (PT): 23  AM-PAC 6 Clicks Score (OT): 24         Yandy Blanchard PT  12/3/2021

## 2021-12-03 NOTE — PLAN OF CARE
Goal Outcome Evaluation:  Plan of Care Reviewed With: patient        Progress: improving  Outcome Summary: Pt able to perform bed mobility Ind and STS with FWW Mod I. Pt ambulate 350' with FWW and CGA for safety with two episodes of knee buckling with no pain or LOB noted. Pt navigated 4 steps with L HR, SPC, and CGA for safety only. Recommend discharge home with spouse and HHPT when medically appropriate.

## 2021-12-03 NOTE — DISCHARGE SUMMARY
Patient Name: Umberto Vigil  MRN: [id number]  : 1955  DOS: 12/3/2021    Attending: Maikel Mendez,*    Primary Care Provider: Vermeesch, Marilyn K, MD    Date of Admission:.2021 10:01 AM    Date of Discharge:  12/3/2021    Discharge Diagnosis:   Status post total right knee replacement    Hypertension    Osteoarthritis of right knee    Primary localized osteoarthritis of right knee      Hospital Course    At admit:     Patient is a pleasant 66 y.o. male presented for scheduled surgery by Dr. Mendez.  He underwent right total knee arthroplasty under spinal anesthesia.  Surgery went well and he was admitted for further medical management.  His knee has been painful for 2 years.  He states it is just worn out over time.  He tried steroid injections without lasting benefits.  He has never used a walker or cane for ambulation.  He denies history of DVT or PE.     When seen in PACU, patient is alert.  He has tolerated eating crackers without nausea.  He is still numb from anesthesia and therefore having no pain in his right lower extremity.  Denies shortness of breath or chest pain.     (Above is noted, agree.  Doing well when I saw him in his room afterwards.  No complaints of nausea, vomiting, or shortness of breath.  Pain control is adequate.  He was seen by PT earlier, ambulated 200 feet using rolling walker and contact-guard assist. )wy     After admit:    Patient was provided pain medications as needed for pain control, along with adductor canal nerve block infusion of Ropivacaine.      Adjustments were made to pain medications to optimize postop pain management. Risks and benefits of opiate medications discussed with patient. KUMAR report was reviewed.    He was seen by PT  and has progressed well over his stay.    Patient used an IS for atelectasis prophylaxis and aspirin along with mechanicals for DVT prophylaxis.    Home medications were resumed as appropriate, and labs were monitored  "and remained fairly stable.     With the progress he has made, Mr. Wagner is ready for DC home today.      He will have an Arrow pump ( instructed on it during this admit).    Discussed with patient regarding plan and he shows understanding and agreement.    Patient will have HHPT following discharge.        Procedures Performed  Procedure(s):  TOTAL KNEE ARTHROPLASTY RIGHT       Pertinent Test Results:    I reviewed the patient's new clinical results.   Results from last 7 days   Lab Units 12/03/21  0928   WBC 10*3/mm3 16.14*   HEMOGLOBIN g/dL 13.0   HEMATOCRIT % 38.4   PLATELETS 10*3/mm3 229           Invalid input(s): LABALBU, PROT  I reviewed the patient's new imaging including images and reports.    Results for WILDER WAGNER ARM (MRN [id number]) as of 12/5/2021 07:47   Ref. Range 12/3/2021 09:28   Glucose Latest Ref Range: 65 - 99 mg/dL 98   Sodium Latest Ref Range: 136 - 145 mmol/L 140   Potassium Latest Ref Range: 3.5 - 5.2 mmol/L 4.3   CO2 Latest Ref Range: 22.0 - 29.0 mmol/L 27.0   Chloride Latest Ref Range: 98 - 107 mmol/L 105   Anion Gap Latest Ref Range: 5.0 - 15.0 mmol/L 8.0   Creatinine Latest Ref Range: 0.76 - 1.27 mg/dL 0.99   BUN Latest Ref Range: 8 - 23 mg/dL 18   BUN/Creatinine Ratio Latest Ref Range: 7.0 - 25.0  18.2   Calcium Latest Ref Range: 8.6 - 10.5 mg/dL 9.1   eGFR Non  Am Latest Ref Range: >60 mL/min/1.73 75     Physical therapy  Progress: improving  Outcome Summary: Pt able to perform bed mobility Ind and STS with FWW Mod I. Pt ambulate 350' with FWW and CGA for safety with two episodes of knee buckling with no pain or LOB noted. Pt navigated 4 steps with L HR, SPC, and CGA for safety only. Recommend discharge home with spouse and HHPT when medically appropriate.  Discharge Assessment:       Visit Vitals  /75 (BP Location: Right arm, Patient Position: Lying)   Pulse 68   Temp 98 °F (36.7 °C) (Oral)   Resp 16   Ht 180.3 cm (71\")   Wt 79.4 kg (175 lb)   SpO2 93%   BMI " 24.41 kg/m²     Temp (24hrs), Av.6 °F (36.4 °C), Min:96.9 °F (36.1 °C), Max:98.2 °F (36.8 °C)      General Appearance:    Alert, cooperative, in no acute distress   Lungs:     Clear to auscultation,respirations regular, even and                   unlabored    Heart:    Regular rhythm and normal rate, normal S1 and S2   Abdomen:     Normal bowel sounds, no masses, no organomegaly, soft        non-tender, non-distended, no guarding, no rebound                 tenderness   Extremities:   CDI dressing surgical knee . ACB cath present, arrow pump.   Pulses:   Pulses palpable and equal bilaterally   Skin:   No bleeding, bruising or rash   Neurologic:   Cranial nerves 2 - 12 grossly intact, sensation intact, Flexion and dorsiflexion intact bilateral feet.         Discharge Disposition: Home          Discharge Medications      New Medications      Instructions Start Date   acetaminophen 500 MG tablet  Commonly known as: TYLENOL   1,000 mg, Oral, Every 8 Hours, Take every 8 hours  as needed after 1 week      aspirin  MG tablet   325 mg, Oral, Daily   Start Date: 2021     docusate sodium 100 MG capsule  Commonly known as: COLACE   100 mg, Oral, 2 Times Daily      meloxicam 15 MG tablet  Commonly known as: MOBIC   15 mg, Oral, Daily      oxyCODONE 5 MG immediate release tablet  Commonly known as: Roxicodone   5 mg, Oral, Every 4 Hours PRN         Continue These Medications      Instructions Start Date   lisinopril 20 MG tablet  Commonly known as: PRINIVIL,ZESTRIL   20 mg, Oral, Daily, as directed         Stop These Medications    Indomethacin 20 MG capsule     methylPREDNISolone 4 MG dose pack  Commonly known as: MEDROL            Discharge Diet: Resume prehospital diet    Activity at Discharge: Weightbearing as tolerated right lower extremity    Follow-up Appointments  Maikel Mendez MD per his orders    Dragon disclaimer:  Part of this encounter note is an electronic transcription/translation of spoken  language to printed text. The electronic translation of spoken language may permit erroneous, or at times, nonsensical words or phrases to be inadvertently transcribed; Although I have reviewed the note for such errors, some may still exist.       Franny Campbell MD  12/03/21  11:26 EST

## 2021-12-03 NOTE — THERAPY DISCHARGE NOTE
Acute Care - Occupational Therapy Discharge  [facility]    Patient Name: Umberto Vigil  : 1955    MRN: [id number]                              Today's Date: 12/3/2021       Admit Date: 2021    Visit Dx: No diagnosis found.  Patient Active Problem List   Diagnosis   • Vitamin D deficiency   • Hypertension   • Gout   • Allergic rhinitis   • Medicare annual wellness visit, initial   • Screening PSA (prostate specific antigen)   • Left hip pain   • Dermatitis   • Osteoarthritis of right knee   • Primary localized osteoarthritis of right knee   • Status post total right knee replacement     Past Medical History:   Diagnosis Date   • Allergy    • Atypical chest pain    • Fracture     h/o   • H/O chest x-ray 2015    Minimal left basilar scar and atelectasis. Otherwise chronic appearing findings. F/U PA and lateral views would be helpful for a more complete evaluation   • H/O renal calculi    • History of PFTs 09/15/2015    No obstruction or restrictions, normal diffusion   • Hypertension    • Malignant melanoma (HCC)     h/o   • S/P bronchoscopy 10/14/2015    Left lung was normal. Distortion found in anterior segment of RUL. An endochonchial lavahe was performed. Brushinhs were obtained. Examinamtion was doubtfully normal. Abnormal CT scan of chest   • Squamous cell carcinoma in situ of skin     h/o   • Tinea pedis    • Tinnitus      Past Surgical History:   Procedure Laterality Date   • BRONCHOSCOPY        Status post bronchoscopy 10/14/2015 with pathology and cytology negative for malignancy   • COLONOSCOPY     • HAND SURGERY     • OTHER SURGICAL HISTORY      Biopsy of skin   • SKIN CANCER EXCISION        General Information     Row Name 21 0750          OT Time and Intention    Document Type discharge evaluation/summary  -TA     Mode of Treatment occupational therapy  -TA     Row Name 21 0750          General Information    Patient Profile Reviewed yes  -TA     Prior Level of  Function independent:; bed mobility; min assist:; gait; transfer; ADL's  -TA     Existing Precautions/Restrictions fall  adductor nerve catheter  -TA     Barriers to Rehab previous functional deficit; medically complex  -TA     Row Name 12/03/21 [facility]0          Occupational Profile    Reason for Services/Referral (Occupational Profile) s/p R TKR, fxl decline from PLOF  -TA     Patient Goals (Occupational Profile) Return home  -TA     Row Name 12/03/21 0750          Living Environment    Lives With spouse  -TA     Row Name 12/03/21 [facility]          Home Main Entrance    Number of Stairs, Main Entrance three  -TA     Row Name 12/03/21 0750          Cognition    Orientation Status (Cognition) oriented x 4  -TA     Row Name 12/03/21 [facility]          Safety Issues, Functional Mobility    Impairments Affecting Function (Mobility) endurance/activity tolerance  -TA           User Key  (r) = Recorded By, (t) = Taken By, (c) = Cosigned By    Initials Name Provider Type    Rubens Berumen, OT Occupational Therapist               Mobility/ADL's     Row Name 12/03/21 [facility]          Bed Mobility    Bed Mobility supine-sit  -TA     Supine-Sit [place] (Bed Mobility) independent  -TA     Sit-Supine [place] (Bed Mobility) not tested  -TA     Row Name 12/03/21 0750          Transfers    Transfers sit-stand transfer  -TA     Sit-Stand [place] (Transfers) modified independence  -TA     Row Name 12/03/21 [facility]          Sit-Stand Transfer    Assistive Device (Sit-Stand Transfers) walker, front-wheeled  -TA     Row Name 12/03/21 [facility]          Functional Mobility    Functional Mobility- Ind. Level conditional independence  -TA     Functional Mobility- Device rolling walker  -TA     Functional Mobility-Distance (Feet) --  In room, hallways  -TA     Functional Mobility-Maintain WBing able to maintain weight bearing status  -TA     Functional Mobility- Comment No LOB or unsteadiness. No safety concerns.  -TA     Row Name 12/03/21  0750          Activities of Daily Living    BADL Assessment/Intervention upper body dressing; lower body dressing; bathing  -TA     Row Name 12/03/21 0750          Mobility    Extremity Weight-bearing Status right lower extremity  -TA     Right Lower Extremity (Weight-bearing Status) weight-bearing as tolerated (WBAT)  -TA     Row Name 12/03/21 [facility]          Upper Body Dressing Assessment/Training    [place] Level (Upper Body Dressing) don; pajama/robe; set up; modified independence  -TA     Position (Upper Body Dressing) edge of bed sitting  -TA     Row Name 12/03/21 [facility]          Lower Body Dressing Assessment/Training    [place] Level (Lower Body Dressing) doff; don; socks; independent  -TA     Position (Lower Body Dressing) edge of bed sitting  -TA     Row Name 12/03/21 [facility]          Bathing Assessment/Intervention    [place] Level (Bathing) distal lower extremities/feet; modified independence  -TA     Assistive Devices (Bathing) long-handled sponge  -TA     Position (Bathing) edge of bed sitting  -TA     Comment (Bathing) simulation  -TA           User Key  (r) = Recorded By, (t) = Taken By, (c) = Cosigned By    Initials Name Provider Type    TA Rubens Llamas, OT Occupational Therapist               Obj/Interventions     Row Name 12/03/21 0750          Sensory Assessment (Somatosensory)    Sensory Assessment (Somatosensory) bilateral UE; sensation intact  -TA     Row Name 12/03/21 [facility]          Vision Assessment/Intervention    Visual Impairment/Limitations WFL  -TA     Row Name 12/03/21 0750          Range of Motion Comprehensive    General Range of Motion bilateral upper extremity ROM WFL  -TA     Row Name 12/03/21 0750          Strength Comprehensive (MMT)    General Manual Muscle Testing (MMT) Assessment no strength deficits identified  -TA     Comment, General Manual Muscle Testing (MMT) Assessment BUE 5/5  -TA     Row Name 12/03/21 0750          Balance    Balance Assessment sitting  dynamic balance; standing dynamic balance  -TA     Dynamic Sitting Balance WFL; sitting, edge of bed  -TA     Dynamic Standing Balance WFL; supported; standing  -TA     Balance Interventions sit to stand; occupation based/functional task; weight shifting activity  -TA           User Key  (r) = Recorded By, (t) = Taken By, (c) = Cosigned By    Initials Name Provider Type    Rubens Berumen OT Occupational Therapist               Goals/Plan     Row Name 12/03/21 [facility]          Therapy Assessment/Plan (OT)    Planned Therapy Interventions (OT) patient/caregiver education/training  -TA           User Key  (r) = Recorded By, (t) = Taken By, (c) = Cosigned By    Initials Name Provider Type    Rubens Berumen OT Occupational Therapist               Clinical Impression     Row Name 12/03/21 [facility]          Pain Assessment    Additional Documentation Pain Scale: Numbers Pre/Post-Treatment (Group)  -TA     Row Name 12/03/21 [facility]          Pain Scale: Numbers Pre/Post-Treatment    Pretreatment Pain Rating 0/10 - no pain  -TA     Posttreatment Pain Rating 1/10  -TA     Pain Location - Side Right  -TA     Pain Location knee  -TA     Pre/Posttreatment Pain Comment Pt tolerated, improved with rest  -TA     Pain Intervention(s) Ambulation/increased activity; Repositioned  -TA     Row Name 12/03/21 [facility]          Plan of Care Review    Plan of Care Reviewed With patient  -TA     Progress improving  -TA     Outcome Summary VSS; Pt presents at/near fxl baseline with ADL performance, fxl  mobility for ADLs. Pt issued LH sponge for LBB and demonstrated independence with use. No DME needs identified. No further acute skilled OT services indicated at this time. Recommend home with spouse and OP services at discharge.  -TA     Row Name 12/03/21 [facility]          Therapy Assessment/Plan (OT)    Patient/Family Therapy Goal Statement (OT) Return home  -TA     Criteria for Skilled Therapeutic Interventions Met (OT) no; no problems  identified which require skilled intervention  -TA     Therapy Frequency (OT) evaluation only  -TA     Predicted Duration of Therapy Intervention (OT) 1 day  -TA     Row Name 12/03/21 [facility]          Therapy Plan Review/Discharge Plan (OT)    Anticipated Discharge Disposition (OT) home with assist; home with outpatient therapy services  -TA     Row Name 12/03/21 [facility]          Vital Signs    Pre Systolic BP Rehab --  VSS; RN cleared pt for tx  -TA     O2 Delivery Pre Treatment room air  -TA     O2 Delivery Intra Treatment room air  -TA     O2 Delivery Post Treatment room air  -TA     Pre Patient Position Supine  -TA     Intra Patient Position Standing  -TA     Post Patient Position Sitting  -TA     Row Name 12/03/21 [facility]          Positioning and Restraints    Pre-Treatment Position in bed  -TA     Post Treatment Position chair  -TA     In Chair notified nsg; reclined; call light within reach; encouraged to call for assist; legs elevated  nerve cath intact  -TA           User Key  (r) = Recorded By, (t) = Taken By, (c) = Cosigned By    Initials Name Provider Type    Rubens Berumen, HUNTER Occupational Therapist               Outcome Measures     Row Name 12/03/21 [facility]          How much help from another is currently needed...    Putting on and taking off regular lower body clothing? 4  -TA     Bathing (including washing, rinsing, and drying) 4  -TA     Toileting (which includes using toilet bed pan or urinal) 4  -TA     Putting on and taking off regular upper body clothing 4  -TA     Taking care of personal grooming (such as brushing teeth) 4  -TA     Eating meals 4  -TA     AM-PAC 6 Clicks Score (OT) 24  -TA     Row Name 12/03/21 [facility]          Functional Assessment    Outcome Measure Options AM-PAC 6 Clicks Daily Activity (OT)  -TA           User Key  (r) = Recorded By, (t) = Taken By, (c) = Cosigned By    Initials Name Provider Type    Rubens Berumen, HUNTER Occupational Therapist              Occupational  Therapy Education                 Title: PT OT SLP Therapies (Done)     Topic: Occupational Therapy (Done)     Point: Precautions (Done)     Description:   Instruct learner(s) on prescribed precautions during self-care and functional transfers.              Learning Progress Summary           Patient Acceptance, E, VU by SLOAN at 12/3/2021 0835                               User Key     Initials Effective Dates Name Provider Type Discipline    SLOAN 06/16/21 -  Rubens Llamas OT Occupational Therapist OT              OT Recommendation and Plan  Retired Outcome Summary/Treatment Plan (OT)  Anticipated Discharge Disposition (OT): home with assist, home with outpatient therapy services  Planned Therapy Interventions (OT): patient/caregiver education/training  Therapy Frequency (OT): evaluation only  Plan of Care Review  Plan of Care Reviewed With: patient  Progress: improving  Outcome Summary: VSS; Pt presents at/near fxl baseline with ADL performance, fxl  mobility for ADLs. Pt issued LH sponge for LBB and demonstrated independence with use. No DME needs identified. No further acute skilled OT services indicated at this time. Recommend home with spouse and OP services at discharge.  Plan of Care Reviewed With: patient  Outcome Summary: VSS; Pt presents at/near fxl baseline with ADL performance, fxl  mobility for ADLs. Pt issued LH sponge for LBB and demonstrated independence with use. No DME needs identified. No further acute skilled OT services indicated at this time. Recommend home with spouse and OP services at discharge.     Time Calculation:    Time Calculation- OT     Row Name 12/03/21 0750             Time Calculation- OT    OT Start Time 0750  ttc 0 minutes  -TA      Total Timed Code Minutes- OT 0 minute(s)  -TA      OT Received On 12/03/21  -TA              Untimed Charges    OT Eval/Re-eval Minutes 50  -TA              Total Minutes    Untimed Charges Total Minutes 50  -TA       Total Minutes 50  -TA             User Key  (r) = Recorded By, (t) = Taken By, (c) = Cosigned By    Initials Name Provider Type    TA Rubens Llamas, OT Occupational Therapist              Therapy Charges for Today     Code Description Service Date Service Provider Modifiers Qty    94474949614 HC OT EVAL MOD COMPLEXITY 4 12/3/2021 Rubens Llamas OT GO 1               Rubens Llamas OT  12/3/2021

## 2021-12-05 NOTE — PROGRESS NOTES
KIERSTEN Yang    Nerve Cath Post Op Call    Patient Name: Umberto Vigil  :  1955  MRN:  [id number]  Date of Discharge: 12/3/2021    Nerve Cath Post Op Call:    Catheter Plan:Patient/Family member report nerve catheter previously discontinued, tip intact  Patient/Family instructed to call ON CALL anesthesia provider for any questions or problems.  Patient Follow Up:

## 2022-01-19 ENCOUNTER — OFFICE VISIT (OUTPATIENT)
Dept: INTERNAL MEDICINE | Facility: CLINIC | Age: 67
End: 2022-01-19

## 2022-01-19 VITALS
TEMPERATURE: 97.3 F | OXYGEN SATURATION: 98 % | SYSTOLIC BLOOD PRESSURE: 144 MMHG | DIASTOLIC BLOOD PRESSURE: 80 MMHG | BODY MASS INDEX: 25.2 KG/M2 | HEIGHT: 71 IN | HEART RATE: 90 BPM | WEIGHT: 180 LBS

## 2022-01-19 DIAGNOSIS — I10 PRIMARY HYPERTENSION: ICD-10-CM

## 2022-01-19 DIAGNOSIS — Z12.5 SCREENING PSA (PROSTATE SPECIFIC ANTIGEN): ICD-10-CM

## 2022-01-19 DIAGNOSIS — Z00.00 MEDICARE ANNUAL WELLNESS VISIT, SUBSEQUENT: Primary | ICD-10-CM

## 2022-01-19 PROCEDURE — 90732 PPSV23 VACC 2 YRS+ SUBQ/IM: CPT | Performed by: INTERNAL MEDICINE

## 2022-01-19 PROCEDURE — 99397 PER PM REEVAL EST PAT 65+ YR: CPT | Performed by: INTERNAL MEDICINE

## 2022-01-19 PROCEDURE — G0009 ADMIN PNEUMOCOCCAL VACCINE: HCPCS | Performed by: INTERNAL MEDICINE

## 2022-01-19 PROCEDURE — 1170F FXNL STATUS ASSESSED: CPT | Performed by: INTERNAL MEDICINE

## 2022-01-19 PROCEDURE — 1159F MED LIST DOCD IN RCRD: CPT | Performed by: INTERNAL MEDICINE

## 2022-01-19 PROCEDURE — G0439 PPPS, SUBSEQ VISIT: HCPCS | Performed by: INTERNAL MEDICINE

## 2022-01-19 RX ORDER — INDOMETHACIN 25 MG/1
25 CAPSULE ORAL 3 TIMES DAILY PRN
Qty: 100 CAPSULE | Refills: 0 | Status: SHIPPED | OUTPATIENT
Start: 2022-01-19 | End: 2022-08-16 | Stop reason: SDUPTHER

## 2022-01-19 NOTE — PROGRESS NOTES
The ABCs of the Annual Wellness Visit  Subsequent Medicare Wellness Visit    Chief Complaint   Patient presents with   • Medicare Wellness-subsequent      Subjective    History of Present Illness:  Umberto Vigil is a 66 y.o. male who presents for a Subsequent Medicare Wellness Visit.  PMH of HTN, allergies, vitamin D deficiency and osteoarthritis.  Remote history of gout, he uses indocin immediately when he has any pain, maybe every few months.  Patient underwent right total knee replacement in December 2021.  His pain is controlled, but occasionally awakens him at night, he remains on pain med just before bed at night only.  He has been going to PT as out pt and knee is flexing to 130 degrees.  He will see ortho this week for follow up.  His BP is up today, he takes medication at night.  He checks BP only once a month and it is usually 120-132/80s.  No CP, SOA, palpitations or edema.  No HA. No current allergies.  Pt states his mother had colon cancer and he has had colonoscopy every 3-5 yrs.  He says last colonoscopy was 2020 per Dr Almeida at [facility], he will get me copy of report.     The following portions of the patient's history were reviewed and   updated as appropriate: allergies, current medications, past family history, past medical history, past social history, past surgical history and problem list.    Compared to one year ago, the patient feels his physical   health is better.    Compared to one year ago, the patient feels his mental   health is the same.    Recent Hospitalizations:  He was admitted within the past 365 days at [facility].  In December 2021 for right knee replacement.      Current Medical Providers:  Patient Care Team:  Vermeesch, Marilyn K, MD as PCP - General (Internal Medicine & Pediatrics)    Outpatient Medications Prior to Visit   Medication Sig Dispense Refill   • lisinopril (PRINIVIL,ZESTRIL) 20 MG tablet Take 1 tablet by mouth Daily. as directed 90 tablet 3   •  "oxyCODONE (Roxicodone) 5 MG immediate release tablet Take 1 tablet by mouth Every 4 (Four) Hours As Needed for Moderate Pain . 40 tablet 0     No facility-administered medications prior to visit.       Opioid medication/s are on active medication list.  and I have evaluated his active treatment plan and pain score trends (see table).  Vitals:    01/19/22 0844   PainSc: 0-No pain     I have reviewed the chart for potential of high risk medication and harmful drug interactions in the elderly.            Aspirin is not on active medication list.  Aspirin use is not indicated based on review of current medical condition/s. Risk of harm outweighs potential benefits.  .    Patient Active Problem List   Diagnosis   • Vitamin D deficiency   • Hypertension   • Gout   • Allergic rhinitis   • Screening PSA (prostate specific antigen)   • Left hip pain   • Dermatitis   • Osteoarthritis of right knee   • Primary localized osteoarthritis of right knee   • Status post total right knee replacement   • Postoperative pain   • Medicare annual wellness visit, subsequent     Advance Care Planning  Advance Directive is not on file.  ACP discussion was held with the patient during this visit. Patient has an advance directive (not in EMR), copy requested.    Review of Systems   Constitutional: Negative.    HENT: Positive for tinnitus.    Eyes: Negative.    Respiratory: Negative.    Cardiovascular: Negative.    Gastrointestinal: Negative.    Endocrine: Negative.    Genitourinary: Negative.    Musculoskeletal: Positive for arthralgias and back pain.   Skin: Negative.    Allergic/Immunologic: Negative.    Neurological: Negative.    Hematological: Negative.    Psychiatric/Behavioral: Negative.         Objective    Vitals:    01/19/22 0844   BP: 144/80   Pulse: 90   Temp: 97.3 °F (36.3 °C)   SpO2: 98%   Weight: 81.6 kg (180 lb)   Height: 180.3 cm (71\")   PainSc: 0-No pain     BMI Readings from Last 1 Encounters:   01/19/22 25.10 kg/m²   BMI is " above normal parameters. Recommendations include: exercise counseling and nutrition counseling    Does the patient have evidence of cognitive impairment? No    Physical Exam  Vitals and nursing note reviewed.   Constitutional:       General: He is not in acute distress.     Appearance: Normal appearance. He is well-developed. He is not ill-appearing.      Comments: Kind and pleasant man, appears stated age and in NAD today   HENT:      Head: Normocephalic and atraumatic.      Right Ear: Tympanic membrane, ear canal and external ear normal.      Left Ear: Tympanic membrane, ear canal and external ear normal.   Eyes:      General:         Right eye: No discharge.         Left eye: No discharge.      Extraocular Movements: Extraocular movements intact.      Conjunctiva/sclera: Conjunctivae normal.      Pupils: Pupils are equal, round, and reactive to light.   Neck:      Thyroid: No thyromegaly.      Vascular: No carotid bruit.      Comments: No thyromegaly or mass  Cardiovascular:      Rate and Rhythm: Normal rate and regular rhythm.      Pulses: Normal pulses.      Heart sounds: Normal heart sounds. No murmur heard.      Pulmonary:      Effort: Pulmonary effort is normal. No respiratory distress.      Breath sounds: Normal breath sounds. No wheezing.   Abdominal:      General: Bowel sounds are normal. There is no distension.      Palpations: Abdomen is soft.      Tenderness: There is no abdominal tenderness.   Musculoskeletal:      Cervical back: Normal range of motion and neck supple.      Right lower leg: No edema.      Left lower leg: No edema.      Comments: Right knee with well-healing surgical scar, slight increased warmth and mild inflammation   Lymphadenopathy:      Cervical: No cervical adenopathy.   Skin:     General: Skin is warm.      Findings: No rash.      Comments: Soles of feet are extremely dry with mild callus along lateral aspect at base of fifth MTP joint bilaterally   Neurological:      General:  No focal deficit present.      Mental Status: He is alert and oriented to person, place, and time. Mental status is at baseline.      Cranial Nerves: No cranial nerve deficit.      Motor: No weakness.      Coordination: Coordination normal.      Gait: Gait normal.   Psychiatric:         Mood and Affect: Mood normal.         Behavior: Behavior normal.         Thought Content: Thought content normal.         Judgment: Judgment normal.       Lab Results   Component Value Date    HGBA1C 5.60 2021            HEALTH RISK ASSESSMENT    Smoking Status:  Social History     Tobacco Use   Smoking Status Former Smoker   • Types: Cigarettes   • Quit date:    • Years since quittin.0   Smokeless Tobacco Never Used     Alcohol Consumption:  Social History     Substance and Sexual Activity   Alcohol Use Yes   • Alcohol/week: 2.0 - 3.0 standard drinks   • Types: 2 - 3 Cans of beer per week    Comment: once every few weeks     Fall Risk Screen:    BRENNA Fall Risk Assessment was completed, and patient is at LOW risk for falls.Assessment completed on:2022    Depression Screening:  PHQ-2/PHQ-9 Depression Screening 2022   Little interest or pleasure in doing things 0   Feeling down, depressed, or hopeless 0   Total Score 0       Health Habits and Functional and Cognitive Screening:  Functional & Cognitive Status 2022   Do you have difficulty preparing food and eating? No   Do you have difficulty bathing yourself, getting dressed or grooming yourself? No   Do you have difficulty using the toilet? No   Do you have difficulty moving around from place to place? No   Do you have trouble with steps or getting out of a bed or a chair? No   Current Diet Well Balanced Diet   Dental Exam Up to date   Eye Exam Up to date   Exercise (times per week) 5 times per week   Current Exercise Activities Include -   Do you need help using the phone?  No   Are you deaf or do you have serious difficulty hearing?  No   Do you need  help with transportation? No   Do you need help shopping? No   Do you need help preparing meals?  No   Do you need help with housework?  No   Do you need help with laundry? No   Do you need help taking your medications? No   Do you need help managing money? No   Do you ever drive or ride in a car without wearing a seat belt? No   Have you felt unusual stress, anger or loneliness in the last month? No   Who do you live with? Spouse   If you need help, do you have trouble finding someone available to you? No   Have you been bothered in the last four weeks by sexual problems? No   Do you have difficulty concentrating, remembering or making decisions? No       Age-appropriate Screening Schedule:  Refer to the list below for future screening recommendations based on patient's age, sex and/or medical conditions. Orders for these recommended tests are listed in the plan section. The patient has been provided with a written plan.    Health Maintenance   Topic Date Due   • TDAP/TD VACCINES (2 - Td or Tdap) 10/15/2028   • INFLUENZA VACCINE  Completed   • ZOSTER VACCINE  Completed              Assessment/Plan   CMS Preventative Services Quick Reference  Risk Factors Identified During Encounter  Immunizations Discussed/Encouraged (specific Immunizations; Pneumococcal 23  The above risks/problems have been discussed with the patient.  Follow up actions/plans if indicated are seen below in the Assessment/Plan Section.  Pertinent information has been shared with the patient in the After Visit Summary.    Diagnoses and all orders for this visit:    1. Medicare annual wellness visit, subsequent (Primary)  -     CBC & Differential  -     Comprehensive Metabolic Panel  -     Lipid Panel With / Chol / HDL Ratio  -     PSA Screen    2. Primary hypertension  -     CBC & Differential  -     Comprehensive Metabolic Panel  -     Lipid Panel With / Chol / HDL Ratio    3. Screening PSA (prostate specific antigen)  -     PSA Screen    Other  orders  -     indomethacin (INDOCIN) 25 MG capsule; Take 1 capsule by mouth 3 (Three) Times a Day As Needed for Mild Pain .  Dispense: 100 capsule; Refill: 0  -     Pneumococcal Polysaccharide Vaccine 23-Valent (PPSV23) Greater Than or Equal To 1yo Subcutaneous / IM    Follow heart healthy/low salt diet  Avoid processed foods  Monitor blood pressure as discussed over next 2 weeks and send me blood pressure readings via MyChart  Exercise as tolerated up to 30 minutes 5 days per week  Take all medications as prescribed  Labs as noted  Request copy of living will for chart  Request copy of colonoscopy for chart  Recommend patient soak feet in warm water with 1/4 cup each of Epson salt, white vinegar and Listerine to help with dry feet  Pneumococcal 23 vaccine provided today      Follow Up:   Return in about 6 months (around 7/19/2022) for Next scheduled follow up.     An After Visit Summary and PPPS were made available to the patient.

## 2022-01-21 DIAGNOSIS — Z96.651 STATUS POST TOTAL RIGHT KNEE REPLACEMENT: ICD-10-CM

## 2022-01-21 RX ORDER — OXYCODONE HYDROCHLORIDE 5 MG/1
5 TABLET ORAL EVERY 8 HOURS PRN
Qty: 20 TABLET | Refills: 0 | Status: SHIPPED | OUTPATIENT
Start: 2022-01-21

## 2022-06-22 LAB
ALBUMIN SERPL-MCNC: 4.6 G/DL (ref 3.5–5.2)
ALBUMIN/GLOB SERPL: 2.4 G/DL
ALP SERPL-CCNC: 86 U/L (ref 39–117)
ALT SERPL-CCNC: 15 U/L (ref 1–41)
AST SERPL-CCNC: 18 U/L (ref 1–40)
BASOPHILS # BLD AUTO: 0.02 10*3/MM3 (ref 0–0.2)
BASOPHILS NFR BLD AUTO: 0.2 % (ref 0–1.5)
BILIRUB SERPL-MCNC: 0.6 MG/DL (ref 0–1.2)
BUN SERPL-MCNC: 16 MG/DL (ref 8–23)
BUN/CREAT SERPL: 14.2 (ref 7–25)
CALCIUM SERPL-MCNC: 9.7 MG/DL (ref 8.6–10.5)
CHLORIDE SERPL-SCNC: 104 MMOL/L (ref 98–107)
CHOLEST SERPL-MCNC: 143 MG/DL (ref 0–200)
CHOLEST/HDLC SERPL: 3.49 {RATIO}
CO2 SERPL-SCNC: 26.6 MMOL/L (ref 22–29)
CREAT SERPL-MCNC: 1.13 MG/DL (ref 0.76–1.27)
EGFRCR SERPLBLD CKD-EPI 2021: 70.8 ML/MIN/1.73
EOSINOPHIL # BLD AUTO: 0.18 10*3/MM3 (ref 0–0.4)
EOSINOPHIL NFR BLD AUTO: 2.1 % (ref 0.3–6.2)
ERYTHROCYTE [DISTWIDTH] IN BLOOD BY AUTOMATED COUNT: 12.8 % (ref 12.3–15.4)
GLOBULIN SER CALC-MCNC: 1.9 GM/DL
GLUCOSE SERPL-MCNC: 116 MG/DL (ref 65–99)
HCT VFR BLD AUTO: 42.1 % (ref 37.5–51)
HDLC SERPL-MCNC: 41 MG/DL (ref 40–60)
HGB BLD-MCNC: 15.3 G/DL (ref 13–17.7)
IMM GRANULOCYTES # BLD AUTO: 0.03 10*3/MM3 (ref 0–0.05)
IMM GRANULOCYTES NFR BLD AUTO: 0.4 % (ref 0–0.5)
LDLC SERPL CALC-MCNC: 88 MG/DL (ref 0–100)
LYMPHOCYTES # BLD AUTO: 1.54 10*3/MM3 (ref 0.7–3.1)
LYMPHOCYTES NFR BLD AUTO: 18.2 % (ref 19.6–45.3)
MCH RBC QN AUTO: 31.3 PG (ref 26.6–33)
MCHC RBC AUTO-ENTMCNC: 36.3 G/DL (ref 31.5–35.7)
MCV RBC AUTO: 86.1 FL (ref 79–97)
MONOCYTES # BLD AUTO: 0.5 10*3/MM3 (ref 0.1–0.9)
MONOCYTES NFR BLD AUTO: 5.9 % (ref 5–12)
NEUTROPHILS # BLD AUTO: 6.21 10*3/MM3 (ref 1.7–7)
NEUTROPHILS NFR BLD AUTO: 73.2 % (ref 42.7–76)
NRBC BLD AUTO-RTO: 0 /100 WBC (ref 0–0.2)
PLATELET # BLD AUTO: 223 10*3/MM3 (ref 140–450)
POTASSIUM SERPL-SCNC: 4.3 MMOL/L (ref 3.5–5.2)
PROT SERPL-MCNC: 6.5 G/DL (ref 6–8.5)
PSA SERPL-MCNC: 1.2 NG/ML (ref 0–4)
RBC # BLD AUTO: 4.89 10*6/MM3 (ref 4.14–5.8)
SODIUM SERPL-SCNC: 140 MMOL/L (ref 136–145)
TRIGL SERPL-MCNC: 70 MG/DL (ref 0–150)
VLDLC SERPL CALC-MCNC: 14 MG/DL (ref 5–40)
WBC # BLD AUTO: 8.48 10*3/MM3 (ref 3.4–10.8)

## 2022-08-16 ENCOUNTER — TELEPHONE (OUTPATIENT)
Dept: INTERNAL MEDICINE | Facility: CLINIC | Age: 67
End: 2022-08-16

## 2022-08-16 RX ORDER — INDOMETHACIN 25 MG/1
25 CAPSULE ORAL 3 TIMES DAILY PRN
Qty: 200 CAPSULE | Refills: 0 | Status: SHIPPED | OUTPATIENT
Start: 2022-08-16 | End: 2022-08-17

## 2022-08-16 NOTE — TELEPHONE ENCOUNTER
[facility] called stating the patient's insurance and does not cover the script for indomethacin. They will cover ibuprofen, naproxen, and baclofen.

## 2022-08-17 RX ORDER — NAPROXEN 500 MG/1
500 TABLET ORAL 2 TIMES DAILY PRN
Qty: 603 TABLET | Refills: 3 | Status: SHIPPED | OUTPATIENT
Start: 2022-08-17

## 2022-08-17 NOTE — TELEPHONE ENCOUNTER
I discontinued indomethacin and changed to naproxen 500 mg twice daily with meals as needed for pain

## 2022-08-18 ENCOUNTER — TELEPHONE (OUTPATIENT)
Dept: INTERNAL MEDICINE | Facility: CLINIC | Age: 67
End: 2022-08-18

## 2022-08-18 ENCOUNTER — PRIOR AUTHORIZATION (OUTPATIENT)
Dept: INTERNAL MEDICINE | Facility: CLINIC | Age: 67
End: 2022-08-18

## 2022-08-18 NOTE — TELEPHONE ENCOUNTER
Status:Approved;Review Type:Prior Auth;Coverage Start Date:07/19/2022;Coverage End Date:08/18/2023;

## 2022-08-18 NOTE — TELEPHONE ENCOUNTER
Caller: Umberto Vigil Arm    Relationship: Self    Best call back number: [phone]    What medications are you currently taking:   Current Outpatient Medications on File Prior to Visit   Medication Sig Dispense Refill   • lisinopril (PRINIVIL,ZESTRIL) 20 MG tablet Take 1 tablet by mouth Daily. as directed 90 tablet 3   • naproxen (Naprosyn) 500 MG tablet Take 1 tablet by mouth 2 (Two) Times a Day As Needed for Mild Pain  (With meals as needed for pain). 603 tablet 3   • oxyCODONE (Roxicodone) 5 MG immediate release tablet Take 1 tablet by mouth Every 8 (Eight) Hours As Needed for Moderate Pain . 20 tablet 0     No current facility-administered medications on file prior to visit.          When did you start taking these medications: NOT STARTED    Which medication are you concerned about: naproxen (Naprosyn) 500 MG tablet     Who prescribed you this medication: VERMEESCH    What are your concerns: PATIENT STATED THAT HE WOULD LIKE TO GO BACK TO THE ORIGINAL MEDICATION INSTEAD OF NAPROXEN BECAUSE THE ORIGINAL MEDICATION THAT WAS PRESCRIBED WORKED BETTER    PLEASE ADVISE    How long have you had these concerns: N/A

## 2022-12-07 RX ORDER — LISINOPRIL 20 MG/1
20 TABLET ORAL DAILY
Qty: 90 TABLET | Refills: 1 | Status: SHIPPED | OUTPATIENT
Start: 2022-12-07

## 2023-08-18 ENCOUNTER — OFFICE VISIT (OUTPATIENT)
Age: 68
End: 2023-08-18
Payer: MEDICARE

## 2023-08-18 VITALS
TEMPERATURE: 97.2 F | HEART RATE: 82 BPM | BODY MASS INDEX: 25.7 KG/M2 | WEIGHT: 183.6 LBS | OXYGEN SATURATION: 97 % | DIASTOLIC BLOOD PRESSURE: 96 MMHG | HEIGHT: 71 IN | SYSTOLIC BLOOD PRESSURE: 154 MMHG

## 2023-08-18 DIAGNOSIS — H61.21 IMPACTED CERUMEN OF RIGHT EAR: Primary | ICD-10-CM

## 2023-08-18 DIAGNOSIS — H65.01 NON-RECURRENT ACUTE SEROUS OTITIS MEDIA OF RIGHT EAR: ICD-10-CM

## 2023-08-18 NOTE — PROGRESS NOTES
Follow Up Office Visit      Date: 2023   Patient Name: Umberto Vigil  : 1955   MRN: [id number]     Chief Complaint:    Chief Complaint   Patient presents with    Ear Fullness     Right ear feels full and stopped up.        History of Present Illness: Umberto Vigil is a 68 y.o. male who is here today for right ear fullness. States that yesterday he felt that his ear was stopped up and couldn't hear well out of it.  States that he has had issues like this before in the past which were due to earwax buildup.  States that yesterday he did try to rinse his ear out with water.  States that he also went out to work in the yard and after that felt that his ear has not somewhat improved back to normal.      Subjective      Review of Systems:   Review of Systems   Constitutional:  Negative for appetite change and unexpected weight loss.   HENT:  Negative for trouble swallowing.    Eyes:  Negative for blurred vision and double vision.   Respiratory:  Negative for cough and shortness of breath.    Cardiovascular:  Negative for chest pain and leg swelling.   Gastrointestinal:  Negative for blood in stool.   Endocrine: Negative for cold intolerance, heat intolerance and polyuria.   Musculoskeletal:  Negative for joint swelling.   Skin:  Negative for color change and bruise.   Neurological:  Negative for numbness and memory problem.   Hematological:  Does not bruise/bleed easily.   Psychiatric/Behavioral:  Negative for suicidal ideas and depressed mood. The patient is not nervous/anxious.      I have reviewed the patients family history, social history, past medical history, past surgical history and have updated it as appropriate.     Medications:     Current Outpatient Medications:     lisinopril (PRINIVIL,ZESTRIL) 20 MG tablet, Take 1 tablet by mouth Daily. as directed, Disp: 90 tablet, Rfl: 0    naproxen (Naprosyn) 500 MG tablet, Take 1 tablet by mouth 2 (Two) Times a Day As Needed for Mild Pain   "(With meals as needed for pain). (Patient not taking: Reported on 8/18/2023), Disp: 603 tablet, Rfl: 3    oxyCODONE (Roxicodone) 5 MG immediate release tablet, Take 1 tablet by mouth Every 8 (Eight) Hours As Needed for Moderate Pain . (Patient not taking: Reported on 8/18/2023), Disp: 20 tablet, Rfl: 0    Allergies:   No Known Allergies    Objective     Physical Exam: Please see above  Vital Signs:   Vitals:    08/18/23 0809   BP: 154/96   Pulse: 82   Temp: 97.2 øF (36.2 øC)   SpO2: 97%   Weight: 83.3 kg (183 lb 9.6 oz)   Height: 180.3 cm (71\")            Physical Exam  Vitals and nursing note reviewed.   Constitutional:       Appearance: Normal appearance.   HENT:      Head: Normocephalic and atraumatic.      Ears:      Comments: Right ear with ear wax in the canal.    After cleaning ear wax, TM of right ear was visualized and does appear to have some fluid behind it.  Eyes:      General: Lids are normal.      Conjunctiva/sclera: Conjunctivae normal.   Cardiovascular:      Rate and Rhythm: Normal rate and regular rhythm.   Pulmonary:      Effort: Pulmonary effort is normal.      Breath sounds: Normal breath sounds and air entry.   Abdominal:      General: Abdomen is flat. Bowel sounds are normal.      Palpations: Abdomen is soft.   Musculoskeletal:      Cervical back: Full passive range of motion without pain and normal range of motion.   Neurological:      General: No focal deficit present.      Mental Status: He is alert and oriented to person, place, and time.   Psychiatric:         Attention and Perception: Attention normal.         Mood and Affect: Mood normal.         Behavior: Behavior normal. Behavior is cooperative.       Ear Cerumen Removal    Date/Time: 8/18/2023 8:40 AM  Performed by: Kvng Lazcano DO  Authorized by: Kvng Lazcano DO     Anesthesia:  Local Anesthetic: none  Location details: right ear  Comments: Ear wax removed  Procedure type: instrumentation, curette       Results:   Labs: "   Hemoglobin A1C   Date Value Ref Range Status   11/18/2021 5.60 4.80 - 5.60 % Final     TSH   Date Value Ref Range Status   08/24/2015 1.69 0.47 - 4.68 uIU/mL Final        POCT Results (if applicable):   Results for orders placed or performed in visit on 01/19/22   Comprehensive Metabolic Panel    Specimen: Blood   Result Value Ref Range    Glucose 116 (H) 65 - 99 mg/dL    BUN 16 8 - 23 mg/dL    Creatinine 1.13 0.76 - 1.27 mg/dL    EGFR Result 70.8 >60.0 mL/min/1.73    BUN/Creatinine Ratio 14.2 7.0 - 25.0    Sodium 140 136 - 145 mmol/L    Potassium 4.3 3.5 - 5.2 mmol/L    Chloride 104 98 - 107 mmol/L    Total CO2 26.6 22.0 - 29.0 mmol/L    Calcium 9.7 8.6 - 10.5 mg/dL    Total Protein 6.5 6.0 - 8.5 g/dL    Albumin 4.60 3.50 - 5.20 g/dL    Globulin 1.9 gm/dL    A/G Ratio 2.4 g/dL    Total Bilirubin 0.6 0.0 - 1.2 mg/dL    Alkaline Phosphatase 86 39 - 117 U/L    AST (SGOT) 18 1 - 40 U/L    ALT (SGPT) 15 1 - 41 U/L   Lipid Panel With / Chol / HDL Ratio    Specimen: Blood   Result Value Ref Range    Total Cholesterol 143 0 - 200 mg/dL    Triglycerides 70 0 - 150 mg/dL    HDL Cholesterol 41 40 - 60 mg/dL    VLDL Cholesterol Edison 14 5 - 40 mg/dL    LDL Chol Calc (NIH) 88 0 - 100 mg/dL    Chol/HDL Ratio 3.49    PSA Screen    Specimen: Blood   Result Value Ref Range    PSA 1.200 0.000 - 4.000 ng/mL   CBC & Differential    Specimen: Blood   Result Value Ref Range    WBC 8.48 3.40 - 10.80 10*3/mm3    RBC 4.89 4.14 - 5.80 10*6/mm3    Hemoglobin 15.3 13.0 - 17.7 g/dL    Hematocrit 42.1 37.5 - 51.0 %    MCV 86.1 79.0 - 97.0 fL    MCH 31.3 26.6 - 33.0 pg    MCHC 36.3 (H) 31.5 - 35.7 g/dL    RDW 12.8 12.3 - 15.4 %    Platelets 223 140 - 450 10*3/mm3    Neutrophil Rel % 73.2 42.7 - 76.0 %    Lymphocyte Rel % 18.2 (L) 19.6 - 45.3 %    Monocyte Rel % 5.9 5.0 - 12.0 %    Eosinophil Rel % 2.1 0.3 - 6.2 %    Basophil Rel % 0.2 0.0 - 1.5 %    Neutrophils Absolute 6.21 1.70 - 7.00 10*3/mm3    Lymphocytes Absolute 1.54 0.70 - 3.10 10*3/mm3     Monocytes Absolute 0.50 0.10 - 0.90 10*3/mm3    Eosinophils Absolute 0.18 0.00 - 0.40 10*3/mm3    Basophils Absolute 0.02 0.00 - 0.20 10*3/mm3    Immature Granulocyte Rel % 0.4 0.0 - 0.5 %    Immature Grans Absolute 0.03 0.00 - 0.05 10*3/mm3    nRBC 0.0 0.0 - 0.2 /100 WBC       Imaging:   No valid procedures specified.       Assessment / Plan      Assessment/Plan:   Diagnoses and all orders for this visit:    1. Impacted cerumen of right ear (Primary)   - Ear wax removed with good effect    2. Non-recurrent acute serous otitis media of right ear   - Discussed with patient about use of anti-histamine and decongestants (recommended he use HBP decongestants).     Other orders  -     Ear Cerumen Removal            Follow Up:   Return if symptoms worsen or fail to improve.      Kvng Lazcano, DO   [facility]

## 2023-09-08 RX ORDER — LISINOPRIL 20 MG/1
TABLET ORAL
Qty: 90 TABLET | Refills: 3 | OUTPATIENT
Start: 2023-09-08

## 2023-09-08 RX ORDER — LISINOPRIL 20 MG/1
20 TABLET ORAL DAILY
Qty: 90 TABLET | Refills: 0 | Status: SHIPPED | OUTPATIENT
Start: 2023-09-08

## 2023-09-08 NOTE — TELEPHONE ENCOUNTER
Rx Refill Note  Requested Prescriptions     Pending Prescriptions Disp Refills    lisinopril (PRINIVIL,ZESTRIL) 20 MG tablet 90 tablet 0     Sig: Take 1 tablet by mouth Daily. as directed      Last office visit with prescribing clinician: Visit date not found   Last telemedicine visit with prescribing clinician: Visit date not found   Next office visit with prescribing clinician: 9/6/2023     Zaki Cavazos MA  09/08/23, 14:54 EDT

## 2023-09-15 ENCOUNTER — TELEPHONE (OUTPATIENT)
Age: 68
End: 2023-09-15
Payer: MEDICARE

## 2023-09-15 ENCOUNTER — CLINICAL SUPPORT (OUTPATIENT)
Age: 68
End: 2023-09-15
Payer: MEDICARE

## 2023-09-15 DIAGNOSIS — U07.1 COVID-19 VIRUS DETECTED: Primary | ICD-10-CM

## 2023-09-15 DIAGNOSIS — R09.81 NASAL CONGESTION: Primary | ICD-10-CM

## 2023-09-15 LAB
EXPIRATION DATE: ABNORMAL
FLUAV AG UPPER RESP QL IA.RAPID: NOT DETECTED
FLUBV AG UPPER RESP QL IA.RAPID: NOT DETECTED
INTERNAL CONTROL: ABNORMAL
Lab: ABNORMAL
SARS-COV-2 AG UPPER RESP QL IA.RAPID: DETECTED

## 2023-09-15 NOTE — TELEPHONE ENCOUNTER
Caller: Umberto Vigil Arm    Relationship: Self    Best call back number: [phone]     What medication are you requesting: PAXLOVID- COVID ANTIVIRAL        Have you had these symptoms before:    [] Yes  [x] No    Have you been treated for these symptoms before:   [] Yes  [x] No    If a prescription is needed, what is your preferred pharmacy and phone number: [id number] [place], [place] - [address] - [phone] [facility] [phone] FX     Additional notes:  PATIENT TESTED POSITIVE FOR COVID YESTERDAY.

## 2023-10-23 ENCOUNTER — OFFICE VISIT (OUTPATIENT)
Age: 68
End: 2023-10-23
Payer: MEDICARE

## 2023-10-23 VITALS
TEMPERATURE: 98.3 F | HEIGHT: 71 IN | BODY MASS INDEX: 25.9 KG/M2 | SYSTOLIC BLOOD PRESSURE: 148 MMHG | HEART RATE: 73 BPM | WEIGHT: 185 LBS | OXYGEN SATURATION: 96 % | DIASTOLIC BLOOD PRESSURE: 98 MMHG

## 2023-10-23 DIAGNOSIS — Z12.5 PROSTATE CANCER SCREENING: ICD-10-CM

## 2023-10-23 DIAGNOSIS — Z00.00 HEALTH CARE MAINTENANCE: ICD-10-CM

## 2023-10-23 DIAGNOSIS — I10 PRIMARY HYPERTENSION: Primary | ICD-10-CM

## 2023-10-23 RX ORDER — LISINOPRIL AND HYDROCHLOROTHIAZIDE 20; 12.5 MG/1; MG/1
1 TABLET ORAL DAILY
Qty: 90 TABLET | Refills: 3 | Status: SHIPPED | OUTPATIENT
Start: 2023-10-23

## 2023-10-23 RX ORDER — INDOMETHACIN 25 MG/1
25 CAPSULE ORAL 3 TIMES DAILY PRN
COMMUNITY

## 2023-10-23 NOTE — PROGRESS NOTES
The ABCs of the Annual Wellness Visit  Subsequent Medicare Wellness Visit    Mercy Vigil is a 68 y.o. male who presents for a Subsequent Medicare Wellness Visit.    The following portions of the patient's history were reviewed and   updated as appropriate: allergies, current medications, past family history, past medical history, past social history, past surgical history, and problem list.    Compared to one year ago, the patient feels his physical   health is better.    Compared to one year ago, the patient feels his mental   health is better.    Recent Hospitalizations:  He was not admitted to the hospital during the last year.       Current Medical Providers:  Patient Care Team:  Kvng Lazcano, DO as PCP - General (Family Medicine)    Outpatient Medications Prior to Visit   Medication Sig Dispense Refill    indomethacin (INDOCIN) 25 MG capsule Take 1 capsule by mouth 3 (Three) Times a Day As Needed for Mild Pain.      lisinopril (PRINIVIL,ZESTRIL) 20 MG tablet Take 1 tablet by mouth Daily. as directed 90 tablet 0    Nirmatrelvir&Ritonavir 300/100 (PAXLOVID) 20 x 150 MG & 10 x 100MG tablet therapy pack tablet Take 3 tablets by mouth 2 (Two) Times a Day. (Patient not taking: Reported on 10/23/2023) 30 tablet 0     No facility-administered medications prior to visit.       No opioid medication identified on active medication list. I have reviewed chart for other potential  high risk medication/s and harmful drug interactions in the elderly.        Aspirin is not on active medication list.  Aspirin use is indicated based on review of current medical condition/s. Pros and cons of this therapy have been discussed with this patient. Benefits of this medication outweigh potential harm.  Patient has been instructed to start taking this medication..    Patient Active Problem List   Diagnosis    Vitamin D deficiency    Hypertension    Gout    Allergic rhinitis    Screening PSA (prostate specific  "antigen)    Left hip pain    Dermatitis    Osteoarthritis of right knee    Primary localized osteoarthritis of right knee    Status post total right knee replacement    Postoperative pain    Medicare annual wellness visit, subsequent     Advance Care Planning   Advance Care Planning     Advance Directive is not on file.  ACP discussion was held with the patient during this visit. Patient has living will     Objective    Vitals:    10/23/23 0820   BP: 148/98   Pulse: 73   Temp: 98.3 °F (36.8 °C)   SpO2: 96%   Weight: 83.9 kg (185 lb)   Height: 180.3 cm (71\")     Estimated body mass index is 25.8 kg/m² as calculated from the following:    Height as of this encounter: 180.3 cm (71\").    Weight as of this encounter: 83.9 kg (185 lb).          Does the patient have evidence of cognitive impairment? No          HEALTH RISK ASSESSMENT    Smoking Status:  Social History     Tobacco Use   Smoking Status Former    Packs/day: 0.50    Years: 4.00    Additional pack years: 0.00    Total pack years: 2.00    Types: Cigarettes    Quit date:     Years since quittin.8   Smokeless Tobacco Never     Alcohol Consumption:  Social History     Substance and Sexual Activity   Alcohol Use Yes    Alcohol/week: 2.0 - 3.0 standard drinks of alcohol    Types: 2 - 3 Cans of beer per week    Comment: once every few weeks     Fall Risk Screen:    BRENNA Fall Risk Assessment was completed, and patient is at LOW risk for falls.Assessment completed on:10/23/2023    Depression Screening:      10/23/2023     8:21 AM   PHQ-2/PHQ-9 Depression Screening   Little Interest or Pleasure in Doing Things 0-->not at all   Feeling Down, Depressed or Hopeless 0-->not at all   PHQ-9: Brief Depression Severity Measure Score 0       Health Habits and Functional and Cognitive Screening:      10/23/2023     8:21 AM   Functional & Cognitive Status   Do you have difficulty preparing food and eating? No   Do you have difficulty bathing yourself, getting dressed or " grooming yourself? No   Do you have difficulty using the toilet? No   Do you have difficulty moving around from place to place? No   Do you have trouble with steps or getting out of a bed or a chair? No   Current Diet Well Balanced Diet   Dental Exam Up to date        Dental Exam Comment 11/1 next appoointment   Eye Exam Up to date   Exercise (times per week) 5 times per week   Current Exercises Include Walking   Do you need help using the phone?  No   Are you deaf or do you have serious difficulty hearing?  No   Do you need help to go to places out of walking distance? No   Do you need help shopping? No   Do you need help preparing meals?  No   Do you need help with housework?  No   Do you need help with laundry? No   Do you need help taking your medications? No   Do you need help managing money? No   Do you ever drive or ride in a car without wearing a seat belt? No   Have you felt unusual stress, anger or loneliness in the last month? No   Who do you live with? Spouse   If you need help, do you have trouble finding someone available to you? No   Have you been bothered in the last four weeks by sexual problems? No   Do you have difficulty concentrating, remembering or making decisions? No       Age-appropriate Screening Schedule:  Refer to the list below for future screening recommendations based on patient's age, sex and/or medical conditions. Orders for these recommended tests are listed in the plan section. The patient has been provided with a written plan.    Health Maintenance   Topic Date Due    BMI FOLLOWUP  Never done    COLORECTAL CANCER SCREENING  09/30/2020    ANNUAL WELLNESS VISIT  01/19/2023    COVID-19 Vaccine (7 - 2023-24 season) 01/12/2024 (Originally 9/1/2023)    INFLUENZA VACCINE  03/31/2024 (Originally 8/1/2023)    TDAP/TD VACCINES (3 - Td or Tdap) 05/09/2033    HEPATITIS C SCREENING  Completed    Pneumococcal Vaccine 65+  Completed    AAA SCREEN (ONE-TIME)  Completed    ZOSTER VACCINE   "Completed                  CMS Preventative Services Quick Reference  Risk Factors Identified During Encounter  Dental Screening Recommended  Vision Screening Recommended  The above risks/problems have been discussed with the patient.  Pertinent information has been shared with the patient in the After Visit Summary.  An After Visit Summary and PPPS were made available to the patient.    Follow Up:   Next Medicare Wellness visit to be scheduled in 1 year.       Additional E&M Note during same encounter follows:  Patient has multiple medical problems which are significant and separately identifiable that require additional work above and beyond the Medicare Wellness Visit.      Chief Complaint  Medicare Wellness-subsequent    Subjective        HPI  Umberto Vigil is also being seen today for wellcare visit and follow up for HTN.  Patient has been monitoring his BP at home with readings around 140s/85s...with a few readings above this.  Denies any complaints at this time.    Review of Systems   All other systems reviewed and are negative.      Objective   Vital Signs:  /98   Pulse 73   Temp 98.3 °F (36.8 °C)   Ht 180.3 cm (71\")   Wt 83.9 kg (185 lb)   SpO2 96%   BMI 25.80 kg/m²     Physical Exam  Vitals and nursing note reviewed.   Constitutional:       Appearance: Normal appearance.   HENT:      Head: Normocephalic and atraumatic.   Eyes:      General: Lids are normal.      Conjunctiva/sclera: Conjunctivae normal.   Cardiovascular:      Rate and Rhythm: Normal rate and regular rhythm.   Pulmonary:      Effort: Pulmonary effort is normal.      Breath sounds: Normal breath sounds and air entry.   Abdominal:      General: Abdomen is flat. Bowel sounds are normal.      Palpations: Abdomen is soft.   Musculoskeletal:      Cervical back: Full passive range of motion without pain and normal range of motion.   Neurological:      General: No focal deficit present.      Mental Status: He is alert and oriented " to person, place, and time.   Psychiatric:         Attention and Perception: Attention normal.         Mood and Affect: Mood normal.         Behavior: Behavior normal. Behavior is cooperative.                     Assessment and Plan   Diagnoses and all orders for this visit:    1. Primary hypertension (Primary)  -     lisinopril-hydrochlorothiazide (PRINZIDE,ZESTORETIC) 20-12.5 MG per tablet; Take 1 tablet by mouth Daily.  Dispense: 90 tablet; Refill: 3    2. Health care maintenance  -     CBC (No Diff); Future  -     Comprehensive metabolic panel; Future  -     Lipid panel; Future  -     Hemoglobin A1c; Future    3. Prostate cancer screening  -     PSA Screen; Future           I spent 30 minutes caring for Umberto on this date of service. This time includes time spent by me in the following activities:preparing for the visit, reviewing tests, obtaining and/or reviewing a separately obtained history, performing a medically appropriate examination and/or evaluation , counseling and educating the patient/family/caregiver, and ordering medications, tests, or procedures  Follow Up   No follow-ups on file.  Patient was given instructions and counseling regarding his condition or for health maintenance advice. Please see specific information pulled into the AVS if appropriate.

## 2023-10-24 DIAGNOSIS — Z12.5 PROSTATE CANCER SCREENING: ICD-10-CM

## 2023-10-24 DIAGNOSIS — Z00.00 HEALTH CARE MAINTENANCE: ICD-10-CM

## 2023-10-25 LAB
ALBUMIN SERPL-MCNC: 4.6 G/DL (ref 3.5–5.2)
ALBUMIN/GLOB SERPL: 2.7 G/DL
ALP SERPL-CCNC: 85 U/L (ref 39–117)
ALT SERPL-CCNC: 18 U/L (ref 1–41)
AST SERPL-CCNC: 21 U/L (ref 1–40)
BILIRUB SERPL-MCNC: 0.4 MG/DL (ref 0–1.2)
BUN SERPL-MCNC: 27 MG/DL (ref 8–23)
BUN/CREAT SERPL: 23.7 (ref 7–25)
CALCIUM SERPL-MCNC: 9.6 MG/DL (ref 8.6–10.5)
CHLORIDE SERPL-SCNC: 103 MMOL/L (ref 98–107)
CHOLEST SERPL-MCNC: 150 MG/DL (ref 0–200)
CO2 SERPL-SCNC: 27.2 MMOL/L (ref 22–29)
CREAT SERPL-MCNC: 1.14 MG/DL (ref 0.76–1.27)
EGFRCR SERPLBLD CKD-EPI 2021: 69.6 ML/MIN/1.73
ERYTHROCYTE [DISTWIDTH] IN BLOOD BY AUTOMATED COUNT: 13 % (ref 12.3–15.4)
GLOBULIN SER CALC-MCNC: 1.7 GM/DL
GLUCOSE SERPL-MCNC: 110 MG/DL (ref 65–99)
HBA1C MFR BLD: 5.6 % (ref 4.8–5.6)
HCT VFR BLD AUTO: 44.9 % (ref 37.5–51)
HDLC SERPL-MCNC: 37 MG/DL (ref 40–60)
HGB BLD-MCNC: 15.4 G/DL (ref 13–17.7)
LDLC SERPL CALC-MCNC: 98 MG/DL (ref 0–100)
MCH RBC QN AUTO: 30.9 PG (ref 26.6–33)
MCHC RBC AUTO-ENTMCNC: 34.3 G/DL (ref 31.5–35.7)
MCV RBC AUTO: 90.2 FL (ref 79–97)
PLATELET # BLD AUTO: 227 10*3/MM3 (ref 140–450)
POTASSIUM SERPL-SCNC: 4.7 MMOL/L (ref 3.5–5.2)
PROT SERPL-MCNC: 6.3 G/DL (ref 6–8.5)
PSA SERPL-MCNC: 0.82 NG/ML (ref 0–4)
RBC # BLD AUTO: 4.98 10*6/MM3 (ref 4.14–5.8)
SODIUM SERPL-SCNC: 141 MMOL/L (ref 136–145)
TRIGL SERPL-MCNC: 79 MG/DL (ref 0–150)
VLDLC SERPL CALC-MCNC: 15 MG/DL (ref 5–40)
WBC # BLD AUTO: 8.04 10*3/MM3 (ref 3.4–10.8)

## 2023-10-25 NOTE — LETTER
Umberto Anuel Vigil  [address] [place] [address]    October 25, 2023     Dear Mr. Vigil:    Below are the results from your recent visit:    Resulted Orders   Hemoglobin A1c   Result Value Ref Range    Hemoglobin A1C 5.60 4.80 - 5.60 %      Comment:      Hemoglobin A1C Ranges:  Increased Risk for Diabetes  5.7% to 6.4%  Diabetes                     >= 6.5%  Diabetic Goal                < 7.0%     PSA Screen   Result Value Ref Range    PSA 0.824 0.000 - 4.000 ng/mL      Comment:      Testing Method: Roche [employer] Electrochemiluminescence  Immunoassay(ECLIA)  Values obtained with different assay methods or kits cannot  be used interchangeably.     Lipid panel   Result Value Ref Range    Total Cholesterol 150 0 - 200 mg/dL      Comment:      Cholesterol Reference Ranges  (U.S. Department of Health and Human Services ATP III  Classifications)  Desirable          <200 mg/dL  Borderline High    200-239 mg/dL  High Risk          >240 mg/dL  Triglyceride Reference Ranges  (U.S. Department of Health and Human Services ATP III  Classifications)  Normal           <150 mg/dL  Borderline High  150-199 mg/dL  High             200-499 mg/dL  Very High        >500 mg/dL  HDL Reference Ranges  (U.S. Department of Health and Human Services ATP III  Classifications)  Low     <40 mg/dl (major risk factor for CHD)  High    >60 mg/dl ('negative' risk factor for CHD)  LDL Reference Ranges  (U.S. Department of Health and Human Services ATP III  Classifications)  Optimal          <100 mg/dL  Near Optimal     100-129 mg/dL  Borderline High  130-159 mg/dL  High             160-189 mg/dL  Very High        >189 mg/dL      Triglycerides 79 0 - 150 mg/dL    HDL Cholesterol 37 (L) 40 - 60 mg/dL    VLDL Cholesterol Edison 15 5 - 40 mg/dL    LDL Chol Calc (NIH) 98 0 - 100 mg/dL   Comprehensive metabolic panel   Result Value Ref Range    Glucose 110 (H) 65 - 99 mg/dL    BUN 27 (H) 8 - 23 mg/dL    Creatinine 1.14 0.76 - 1.27 mg/dL    EGFR  Result 69.6 >60.0 mL/min/1.73      Comment:      GFR Normal >60  Chronic Kidney Disease <60  Kidney Failure <15      BUN/Creatinine Ratio 23.7 7.0 - 25.0    Sodium 141 136 - 145 mmol/L    Potassium 4.7 3.5 - 5.2 mmol/L    Chloride 103 98 - 107 mmol/L    Total CO2 27.2 22.0 - 29.0 mmol/L    Calcium 9.6 8.6 - 10.5 mg/dL    Total Protein 6.3 6.0 - 8.5 g/dL    Albumin 4.6 3.5 - 5.2 g/dL    Globulin 1.7 gm/dL    A/G Ratio 2.7 g/dL    Total Bilirubin 0.4 0.0 - 1.2 mg/dL    Alkaline Phosphatase 85 39 - 117 U/L    AST (SGOT) 21 1 - 40 U/L    ALT (SGPT) 18 1 - 41 U/L   CBC (No Diff)   Result Value Ref Range    WBC 8.04 3.40 - 10.80 10*3/mm3    RBC 4.98 4.14 - 5.80 10*6/mm3    Hemoglobin 15.4 13.0 - 17.7 g/dL    Hematocrit 44.9 37.5 - 51.0 %    MCV 90.2 79.0 - 97.0 fL    MCH 30.9 26.6 - 33.0 pg    MCHC 34.3 31.5 - 35.7 g/dL    RDW 13.0 12.3 - 15.4 %    Platelets 227 140 - 450 10*3/mm3       Lab work looks good.  No changes needed based on these results.    If you have any questions or concerns, please don't hesitate to call.         Sincerely,        Kvng Lazcano, DO

## 2024-01-23 RX ORDER — INDOMETHACIN 25 MG/1
25 CAPSULE ORAL 3 TIMES DAILY PRN
Qty: 200 CAPSULE | Refills: 4 | OUTPATIENT
Start: 2024-01-23

## 2024-04-25 NOTE — ANESTHESIA POSTPROCEDURE EVALUATION
Medicare Message     Important Message from Medicare regarding Discharge Appeal Rights Given to patient/caregiver; Explained to patient/caregiver; Other (comments)Important Message from Medicare regarding Discharge Appeal Rights. Given to patient/caregiver; Explained to patient/caregiver; Other (comments). The comment is Verbal Consent. Taken on 4/25/24 1016   Date IMM was signed 4/25/2024   Time IMM was signed 0853      Patient: Umberto Vigil    Procedure Summary     Date: 12/02/21 [facility]    Anesthesia Start: 1150 Anesthesia Stop: 1321    Procedure: TOTAL KNEE ARTHROPLASTY RIGHT (Right Knee) Diagnosis:     Surgeons: Maikel Mendez MD Provider:     Anesthesia Type: spinal ASA Status: 2          Anesthesia Type: spinal    Vitals  Vitals Value Taken Time   BP     Temp     Pulse     Resp     SpO2 97 % 12/02/21 1319   Vitals shown include unvalidated device data.        Post Anesthesia Care and Evaluation    Patient location during evaluation: PACU  Patient participation: complete - patient participated  Level of consciousness: awake and alert  Pain score: 0  Pain management: adequate  Airway patency: patent  Anesthetic complications: No anesthetic complications  PONV Status: none  Cardiovascular status: hemodynamically stable and acceptable  Respiratory status: nonlabored ventilation, acceptable and nasal cannula  Hydration status: acceptable

## 2024-05-01 ENCOUNTER — OFFICE VISIT (OUTPATIENT)
Age: 69
End: 2024-05-01
Payer: MEDICARE

## 2024-05-01 VITALS
DIASTOLIC BLOOD PRESSURE: 76 MMHG | SYSTOLIC BLOOD PRESSURE: 128 MMHG | HEART RATE: 86 BPM | WEIGHT: 186.6 LBS | BODY MASS INDEX: 26.12 KG/M2 | TEMPERATURE: 98 F | HEIGHT: 71 IN | OXYGEN SATURATION: 95 %

## 2024-05-01 DIAGNOSIS — I10 PRIMARY HYPERTENSION: ICD-10-CM

## 2024-05-01 DIAGNOSIS — H69.92 EUSTACHIAN TUBE DYSFUNCTION, LEFT: Primary | ICD-10-CM

## 2024-05-01 PROCEDURE — 3078F DIAST BP <80 MM HG: CPT | Performed by: FAMILY MEDICINE

## 2024-05-01 PROCEDURE — 3074F SYST BP LT 130 MM HG: CPT | Performed by: FAMILY MEDICINE

## 2024-05-01 PROCEDURE — 99213 OFFICE O/P EST LOW 20 MIN: CPT | Performed by: FAMILY MEDICINE

## 2024-05-01 RX ORDER — LISINOPRIL AND HYDROCHLOROTHIAZIDE 20; 12.5 MG/1; MG/1
1 TABLET ORAL DAILY
Qty: 90 TABLET | Refills: 3 | Status: SHIPPED | OUTPATIENT
Start: 2024-05-01

## 2024-05-01 RX ORDER — PREDNISONE 5 MG/1
1 TABLET ORAL TAKE AS DIRECTED
Qty: 21 TABLET | Refills: 0 | Status: SHIPPED | OUTPATIENT
Start: 2024-05-01

## 2024-05-01 RX ORDER — INDOMETHACIN 25 MG/1
25 CAPSULE ORAL 3 TIMES DAILY PRN
Qty: 90 CAPSULE | Refills: 1 | Status: SHIPPED | OUTPATIENT
Start: 2024-05-01

## 2024-05-01 NOTE — PROGRESS NOTES
Follow Up Office Visit      Date: 2024   Patient Name: Umberto Vigil  : 1955   MRN: [id number]     Chief Complaint:    Chief Complaint   Patient presents with    Earache     Ear fullness       History of Present Illness: Umberto Vigil is a 69 y.o. male who is here today for left ear fullness.  States that he woke up this morning and after about 20 minutes noted that he was having a hard time with hearing out of his left ear.    Subjective      Review of Systems:   Review of Systems   Constitutional:  Negative for appetite change and unexpected weight loss.   HENT:  Negative for trouble swallowing.    Eyes:  Negative for blurred vision and double vision.   Respiratory:  Negative for cough and shortness of breath.    Cardiovascular:  Negative for chest pain and leg swelling.   Gastrointestinal:  Negative for blood in stool.   Endocrine: Negative for cold intolerance, heat intolerance and polyuria.   Musculoskeletal:  Negative for joint swelling.   Skin:  Negative for color change and bruise.   Neurological:  Negative for numbness and memory problem.   Hematological:  Does not bruise/bleed easily.   Psychiatric/Behavioral:  Negative for suicidal ideas and depressed mood. The patient is not nervous/anxious.        I have reviewed the patients family history, social history, past medical history, past surgical history and have updated it as appropriate.     Medications:     Current Outpatient Medications:     indomethacin (INDOCIN) 25 MG capsule, Take 1 capsule by mouth 3 (Three) Times a Day As Needed for Mild Pain., Disp: 90 capsule, Rfl: 1    lisinopril-hydrochlorothiazide (PRINZIDE,ZESTORETIC) 20-12.5 MG per tablet, Take 1 tablet by mouth Daily., Disp: 90 tablet, Rfl: 3    predniSONE 5 MG (21) tablet therapy pack dose pack, Take 1 tablet by mouth Take As Directed. Take as directed on package instructions., Disp: 21 tablet, Rfl: 0    Allergies:   No Known Allergies    Objective     Physical  "Exam: Please see above  Vital Signs:   Vitals:    05/01/24 1129   BP: 128/76   Pulse: 86   Temp: 98 °F (36.7 °C)   SpO2: 95%   Weight: 84.6 kg (186 lb 9.6 oz)   Height: 180.3 cm (71\")     Body mass index is 26.03 kg/m².    Physical Exam  Vitals and nursing note reviewed.   Constitutional:       Appearance: Normal appearance.   HENT:      Head: Normocephalic and atraumatic.      Left Ear: A middle ear effusion is present.   Eyes:      General: Lids are normal.      Conjunctiva/sclera: Conjunctivae normal.   Cardiovascular:      Rate and Rhythm: Normal rate and regular rhythm.   Pulmonary:      Effort: Pulmonary effort is normal.      Breath sounds: Normal breath sounds and air entry.   Abdominal:      General: Abdomen is flat. Bowel sounds are normal.      Palpations: Abdomen is soft.   Musculoskeletal:      Cervical back: Full passive range of motion without pain and normal range of motion.   Neurological:      General: No focal deficit present.      Mental Status: He is alert and oriented to person, place, and time.   Psychiatric:         Attention and Perception: Attention normal.         Mood and Affect: Mood normal.         Behavior: Behavior normal. Behavior is cooperative.         Procedures    Results:   Labs:   Hemoglobin A1C   Date Value Ref Range Status   10/24/2023 5.60 4.80 - 5.60 % Final     Comment:     Hemoglobin A1C Ranges:  Increased Risk for Diabetes  5.7% to 6.4%  Diabetes                     >= 6.5%  Diabetic Goal                < 7.0%     11/18/2021 5.60 4.80 - 5.60 % Final     TSH   Date Value Ref Range Status   08/24/2015 1.69 0.47 - 4.68 uIU/mL Final        POCT Results (if applicable):   Results for orders placed or performed in visit on 10/24/23   Hemoglobin A1c    Specimen: Blood   Result Value Ref Range    Hemoglobin A1C 5.60 4.80 - 5.60 %   PSA Screen    Specimen: Blood   Result Value Ref Range    PSA 0.824 0.000 - 4.000 ng/mL   Lipid panel    Specimen: Blood   Result Value Ref Range    " Total Cholesterol 150 0 - 200 mg/dL    Triglycerides 79 0 - 150 mg/dL    HDL Cholesterol 37 (L) 40 - 60 mg/dL    VLDL Cholesterol Edison 15 5 - 40 mg/dL    LDL Chol Calc (NIH) 98 0 - 100 mg/dL   Comprehensive metabolic panel    Specimen: Blood   Result Value Ref Range    Glucose 110 (H) 65 - 99 mg/dL    BUN 27 (H) 8 - 23 mg/dL    Creatinine 1.14 0.76 - 1.27 mg/dL    EGFR Result 69.6 >60.0 mL/min/1.73    BUN/Creatinine Ratio 23.7 7.0 - 25.0    Sodium 141 136 - 145 mmol/L    Potassium 4.7 3.5 - 5.2 mmol/L    Chloride 103 98 - 107 mmol/L    Total CO2 27.2 22.0 - 29.0 mmol/L    Calcium 9.6 8.6 - 10.5 mg/dL    Total Protein 6.3 6.0 - 8.5 g/dL    Albumin 4.6 3.5 - 5.2 g/dL    Globulin 1.7 gm/dL    A/G Ratio 2.7 g/dL    Total Bilirubin 0.4 0.0 - 1.2 mg/dL    Alkaline Phosphatase 85 39 - 117 U/L    AST (SGOT) 21 1 - 40 U/L    ALT (SGPT) 18 1 - 41 U/L   CBC (No Diff)    Specimen: Blood   Result Value Ref Range    WBC 8.04 3.40 - 10.80 10*3/mm3    RBC 4.98 4.14 - 5.80 10*6/mm3    Hemoglobin 15.4 13.0 - 17.7 g/dL    Hematocrit 44.9 37.5 - 51.0 %    MCV 90.2 79.0 - 97.0 fL    MCH 30.9 26.6 - 33.0 pg    MCHC 34.3 31.5 - 35.7 g/dL    RDW 13.0 12.3 - 15.4 %    Platelets 227 140 - 450 10*3/mm3       Imaging:   No valid procedures specified.         Assessment / Plan      Assessment/Plan:   Diagnoses and all orders for this visit:    1. Eustachian tube dysfunction, left (Primary)  -     predniSONE 5 MG (21) tablet therapy pack dose pack; Take 1 tablet by mouth Take As Directed. Take as directed on package instructions.  Dispense: 21 tablet; Refill: 0    2. Primary hypertension  -     lisinopril-hydrochlorothiazide (PRINZIDE,ZESTORETIC) 20-12.5 MG per tablet; Take 1 tablet by mouth Daily.  Dispense: 90 tablet; Refill: 3    Other orders  -     indomethacin (INDOCIN) 25 MG capsule; Take 1 capsule by mouth 3 (Three) Times a Day As Needed for Mild Pain.  Dispense: 90 capsule; Refill: 1            Vaccine Counseling:      Follow Up:   Return  in about 6 months (around 11/1/2024) for Recheck.      Kvng Lazcano, DO   [facility]

## 2024-05-02 ENCOUNTER — TELEPHONE (OUTPATIENT)
Age: 69
End: 2024-05-02
Payer: MEDICARE

## 2024-05-07 ENCOUNTER — TELEPHONE (OUTPATIENT)
Age: 69
End: 2024-05-07
Payer: MEDICARE

## 2024-07-08 ENCOUNTER — OFFICE VISIT (OUTPATIENT)
Age: 69
End: 2024-07-08
Payer: MEDICARE

## 2024-07-08 VITALS
BODY MASS INDEX: 26.1 KG/M2 | TEMPERATURE: 98.5 F | WEIGHT: 186.4 LBS | HEART RATE: 74 BPM | DIASTOLIC BLOOD PRESSURE: 88 MMHG | HEIGHT: 71 IN | OXYGEN SATURATION: 96 % | SYSTOLIC BLOOD PRESSURE: 138 MMHG

## 2024-07-08 DIAGNOSIS — I10 PRIMARY HYPERTENSION: Primary | ICD-10-CM

## 2024-07-08 DIAGNOSIS — M10.072 IDIOPATHIC GOUT OF LEFT FOOT, UNSPECIFIED CHRONICITY: ICD-10-CM

## 2024-07-08 PROCEDURE — 3075F SYST BP GE 130 - 139MM HG: CPT | Performed by: FAMILY MEDICINE

## 2024-07-08 PROCEDURE — 3079F DIAST BP 80-89 MM HG: CPT | Performed by: FAMILY MEDICINE

## 2024-07-08 PROCEDURE — 1126F AMNT PAIN NOTED NONE PRSNT: CPT | Performed by: FAMILY MEDICINE

## 2024-07-08 PROCEDURE — 99213 OFFICE O/P EST LOW 20 MIN: CPT | Performed by: FAMILY MEDICINE

## 2024-07-08 RX ORDER — LISINOPRIL 30 MG/1
30 TABLET ORAL DAILY
Qty: 90 TABLET | Refills: 0 | Status: SHIPPED | OUTPATIENT
Start: 2024-07-08

## 2024-07-08 RX ORDER — PREDNISONE 5 MG/1
1 TABLET ORAL TAKE AS DIRECTED
Qty: 21 TABLET | Refills: 0 | Status: SHIPPED | OUTPATIENT
Start: 2024-07-08

## 2024-07-08 RX ORDER — LISINOPRIL 30 MG/1
30 TABLET ORAL DAILY
Qty: 30 TABLET | Refills: 3 | Status: SHIPPED | OUTPATIENT
Start: 2024-07-08 | End: 2024-07-08

## 2024-07-08 NOTE — PROGRESS NOTES
Follow Up Office Visit      Date: 2024   Patient Name: Umberto Vigil  : 1955   MRN: [id number]     Chief Complaint:    Chief Complaint   Patient presents with    Hypertension     Blood pressure medication, patient feels medication is causing gout flare ups.       History of Present Illness: Umberto Vigil is a 69 y.o. male who is here today for HTN.  Is worried that HCTZ may be causing his gout to flare up as it has seem to have gotten worse since he was started on the medicine.    Subjective      Review of Systems:   Review of Systems   Constitutional:  Negative for appetite change and unexpected weight loss.   HENT:  Negative for trouble swallowing.    Eyes:  Negative for blurred vision and double vision.   Respiratory:  Negative for cough and shortness of breath.    Cardiovascular:  Negative for chest pain and leg swelling.   Gastrointestinal:  Negative for blood in stool.   Endocrine: Negative for cold intolerance, heat intolerance and polyuria.   Musculoskeletal:  Negative for joint swelling.   Skin:  Negative for color change and bruise.   Neurological:  Negative for numbness and memory problem.   Hematological:  Does not bruise/bleed easily.   Psychiatric/Behavioral:  Negative for suicidal ideas and depressed mood. The patient is not nervous/anxious.        I have reviewed the patients family history, social history, past medical history, past surgical history and have updated it as appropriate.     Medications:     Current Outpatient Medications:     indomethacin (INDOCIN) 25 MG capsule, Take 1 capsule by mouth 3 (Three) Times a Day As Needed for Mild Pain., Disp: 90 capsule, Rfl: 1    lisinopril (PRINIVIL,ZESTRIL) 30 MG tablet, Take 1 tablet by mouth Daily., Disp: 30 tablet, Rfl: 3    predniSONE 5 MG (21) tablet therapy pack dose pack, Take 1 tablet by mouth Take As Directed. Take as directed on package instructions., Disp: 21 tablet, Rfl: 0    Allergies:   No Known  "Allergies    Objective     Physical Exam: Please see above  Vital Signs:   Vitals:    07/08/24 1116   BP: 138/88   Pulse: 74   Temp: 98.5 °F (36.9 °C)   SpO2: 96%   Weight: 84.6 kg (186 lb 6.4 oz)   Height: 180.3 cm (71\")     Body mass index is 26 kg/m².    Physical Exam  Vitals and nursing note reviewed.   Constitutional:       Appearance: Normal appearance.   HENT:      Head: Normocephalic and atraumatic.   Eyes:      General: Lids are normal.      Conjunctiva/sclera: Conjunctivae normal.   Cardiovascular:      Rate and Rhythm: Normal rate and regular rhythm.   Pulmonary:      Effort: Pulmonary effort is normal.      Breath sounds: Normal breath sounds and air entry.   Abdominal:      General: Abdomen is flat. Bowel sounds are normal.      Palpations: Abdomen is soft.   Musculoskeletal:      Cervical back: Full passive range of motion without pain and normal range of motion.   Neurological:      General: No focal deficit present.      Mental Status: He is alert and oriented to person, place, and time.   Psychiatric:         Attention and Perception: Attention normal.         Mood and Affect: Mood normal.         Behavior: Behavior normal. Behavior is cooperative.         Procedures    Results:   Labs:   Hemoglobin A1C   Date Value Ref Range Status   10/24/2023 5.60 4.80 - 5.60 % Final     Comment:     Hemoglobin A1C Ranges:  Increased Risk for Diabetes  5.7% to 6.4%  Diabetes                     >= 6.5%  Diabetic Goal                < 7.0%     11/18/2021 5.60 4.80 - 5.60 % Final     TSH   Date Value Ref Range Status   08/24/2015 1.69 0.47 - 4.68 uIU/mL Final        POCT Results (if applicable):   Results for orders placed or performed in visit on 10/24/23   Hemoglobin A1c    Specimen: Blood   Result Value Ref Range    Hemoglobin A1C 5.60 4.80 - 5.60 %   PSA Screen    Specimen: Blood   Result Value Ref Range    PSA 0.824 0.000 - 4.000 ng/mL   Lipid panel    Specimen: Blood   Result Value Ref Range    Total " Cholesterol 150 0 - 200 mg/dL    Triglycerides 79 0 - 150 mg/dL    HDL Cholesterol 37 (L) 40 - 60 mg/dL    VLDL Cholesterol Edison 15 5 - 40 mg/dL    LDL Chol Calc (NIH) 98 0 - 100 mg/dL   Comprehensive metabolic panel    Specimen: Blood   Result Value Ref Range    Glucose 110 (H) 65 - 99 mg/dL    BUN 27 (H) 8 - 23 mg/dL    Creatinine 1.14 0.76 - 1.27 mg/dL    EGFR Result 69.6 >60.0 mL/min/1.73    BUN/Creatinine Ratio 23.7 7.0 - 25.0    Sodium 141 136 - 145 mmol/L    Potassium 4.7 3.5 - 5.2 mmol/L    Chloride 103 98 - 107 mmol/L    Total CO2 27.2 22.0 - 29.0 mmol/L    Calcium 9.6 8.6 - 10.5 mg/dL    Total Protein 6.3 6.0 - 8.5 g/dL    Albumin 4.6 3.5 - 5.2 g/dL    Globulin 1.7 gm/dL    A/G Ratio 2.7 g/dL    Total Bilirubin 0.4 0.0 - 1.2 mg/dL    Alkaline Phosphatase 85 39 - 117 U/L    AST (SGOT) 21 1 - 40 U/L    ALT (SGPT) 18 1 - 41 U/L   CBC (No Diff)    Specimen: Blood   Result Value Ref Range    WBC 8.04 3.40 - 10.80 10*3/mm3    RBC 4.98 4.14 - 5.80 10*6/mm3    Hemoglobin 15.4 13.0 - 17.7 g/dL    Hematocrit 44.9 37.5 - 51.0 %    MCV 90.2 79.0 - 97.0 fL    MCH 30.9 26.6 - 33.0 pg    MCHC 34.3 31.5 - 35.7 g/dL    RDW 13.0 12.3 - 15.4 %    Platelets 227 140 - 450 10*3/mm3       Imaging:   No valid procedures specified.         Assessment / Plan      Assessment/Plan:   Diagnoses and all orders for this visit:    1. Primary hypertension (Primary)  -     lisinopril (PRINIVIL,ZESTRIL) 30 MG tablet; Take 1 tablet by mouth Daily.  Dispense: 30 tablet; Refill: 3    2. Idiopathic gout of left foot, unspecified chronicity  -     predniSONE 5 MG (21) tablet therapy pack dose pack; Take 1 tablet by mouth Take As Directed. Take as directed on package instructions.  Dispense: 21 tablet; Refill: 0            Vaccine Counseling:      Follow Up:   No follow-ups on file.      Kvng Lazcano,    [facility]

## 2024-11-05 DIAGNOSIS — I10 PRIMARY HYPERTENSION: ICD-10-CM

## 2024-11-05 RX ORDER — INDOMETHACIN 25 MG/1
25 CAPSULE ORAL 3 TIMES DAILY PRN
Qty: 90 CAPSULE | Refills: 1 | Status: SHIPPED | OUTPATIENT
Start: 2024-11-05

## 2024-11-05 RX ORDER — LISINOPRIL 30 MG/1
30 TABLET ORAL DAILY
Qty: 90 TABLET | Refills: 3 | Status: SHIPPED | OUTPATIENT
Start: 2024-11-05

## 2024-11-05 NOTE — TELEPHONE ENCOUNTER
Rx Refill Note  Requested Prescriptions     Pending Prescriptions Disp Refills    lisinopril (PRINIVIL,ZESTRIL) 30 MG tablet [Pharmacy Med Name: LISINOPRIL TABS 30MG] 30 tablet 11     Sig: TAKE 1 TABLET DAILY    indomethacin (INDOCIN) 25 MG capsule [Pharmacy Med Name: INDOMETHACIN CAPS 25MG] 90 capsule 11     Sig: TAKE 1 CAPSULE THREE TIMES A DAY AS NEEDED FOR MILD PAIN      Last office visit with prescribing clinician: 7/8/2024   Last telemedicine visit with prescribing clinician: Visit date not found   Next office visit with prescribing clinician: Visit date not found     Jaki Rdz MA  11/05/24, 10:28 EST

## 2025-03-12 ENCOUNTER — OFFICE VISIT (OUTPATIENT)
Age: 70
End: 2025-03-12
Payer: MEDICARE

## 2025-03-12 VITALS
TEMPERATURE: 98 F | BODY MASS INDEX: 25.76 KG/M2 | WEIGHT: 184 LBS | DIASTOLIC BLOOD PRESSURE: 99 MMHG | OXYGEN SATURATION: 93 % | SYSTOLIC BLOOD PRESSURE: 145 MMHG | HEIGHT: 71 IN | HEART RATE: 78 BPM

## 2025-03-12 DIAGNOSIS — Z12.5 PROSTATE CANCER SCREENING: ICD-10-CM

## 2025-03-12 DIAGNOSIS — I10 PRIMARY HYPERTENSION: Primary | ICD-10-CM

## 2025-03-12 PROCEDURE — 1126F AMNT PAIN NOTED NONE PRSNT: CPT | Performed by: FAMILY MEDICINE

## 2025-03-12 PROCEDURE — 80061 LIPID PANEL: CPT | Performed by: FAMILY MEDICINE

## 2025-03-12 PROCEDURE — 3077F SYST BP >= 140 MM HG: CPT | Performed by: FAMILY MEDICINE

## 2025-03-12 PROCEDURE — 3080F DIAST BP >= 90 MM HG: CPT | Performed by: FAMILY MEDICINE

## 2025-03-12 PROCEDURE — 99213 OFFICE O/P EST LOW 20 MIN: CPT | Performed by: FAMILY MEDICINE

## 2025-03-12 PROCEDURE — G0439 PPPS, SUBSEQ VISIT: HCPCS | Performed by: FAMILY MEDICINE

## 2025-03-12 PROCEDURE — G0103 PSA SCREENING: HCPCS | Performed by: FAMILY MEDICINE

## 2025-03-12 PROCEDURE — 1170F FXNL STATUS ASSESSED: CPT | Performed by: FAMILY MEDICINE

## 2025-03-12 PROCEDURE — 80053 COMPREHEN METABOLIC PANEL: CPT | Performed by: FAMILY MEDICINE

## 2025-03-12 RX ORDER — LISINOPRIL 40 MG/1
40 TABLET ORAL DAILY
Qty: 90 TABLET | Refills: 3 | Status: SHIPPED | OUTPATIENT
Start: 2025-03-12

## 2025-03-12 NOTE — ASSESSMENT & PLAN NOTE
Orders:    CBC (No Diff); Future    Comprehensive metabolic panel; Future    Lipid panel; Future    lisinopril (PRINIVIL,ZESTRIL) 40 MG tablet; Take 1 tablet by mouth Daily.

## 2025-03-12 NOTE — PROGRESS NOTES
Subjective   The ABCs of the Annual Wellness Visit  Medicare Wellness Visit      Umberto Vigil is a 70 y.o. patient who presents for a Medicare Wellness Visit.    The following portions of the patient's history were reviewed and   updated as appropriate: allergies, current medications, past family history, past medical history, past social history, past surgical history, and problem list.    Compared to one year ago, the patient's physical   health is the same.  Compared to one year ago, the patient's mental   health is the same.    Recent Hospitalizations:  He was not admitted to the hospital during the last year.     Current Medical Providers:  Patient Care Team:  Kvng Lazcano, DO as PCP - General (Family Medicine)    Outpatient Medications Prior to Visit   Medication Sig Dispense Refill    indomethacin (INDOCIN) 25 MG capsule TAKE 1 CAPSULE THREE TIMES A DAY AS NEEDED FOR MILD PAIN 90 capsule 1    lisinopril (PRINIVIL,ZESTRIL) 30 MG tablet TAKE 1 TABLET DAILY 90 tablet 3    predniSONE 5 MG (21) tablet therapy pack dose pack Take 1 tablet by mouth Take As Directed. Take as directed on package instructions. (Patient not taking: Reported on 3/12/2025) 21 tablet 0     No facility-administered medications prior to visit.     No opioid medication identified on active medication list. I have reviewed chart for other potential  high risk medication/s and harmful drug interactions in the elderly.      Aspirin is not on active medication list.   Recommended .    Patient Active Problem List   Diagnosis    Vitamin D deficiency    Hypertension    Gout    Allergic rhinitis    Screening PSA (prostate specific antigen)    Left hip pain    Dermatitis    Osteoarthritis of right knee    Primary localized osteoarthritis of right knee    Status post total right knee replacement    Postoperative pain    Medicare annual wellness visit, subsequent     Advance Care Planning Advance Directive is on file.  ACP discussion was held  "with the patient during this visit. Patient has an advance directive in EMR which is still valid.             Objective   Vitals:    25 0813   BP: 145/99   Pulse: 78   Temp: 98 °F (36.7 °C)   SpO2: 93%   Weight: 83.5 kg (184 lb)   Height: 180.3 cm (71\")   PainSc: 0-No pain       Estimated body mass index is 25.66 kg/m² as calculated from the following:    Height as of this encounter: 180.3 cm (71\").    Weight as of this encounter: 83.5 kg (184 lb).                Does the patient have evidence of cognitive impairment? No  Lab Results   Component Value Date    TRIG 72 2025    HDL 36 (L) 2025    LDL 89 2025    VLDL 14 2025                                                                                                Health  Risk Assessment    Smoking Status:  Social History     Tobacco Use   Smoking Status Former    Current packs/day: 0.00    Average packs/day: 0.5 packs/day for 4.0 years (2.0 ttl pk-yrs)    Types: Cigarettes    Start date:     Quit date:     Years since quittin.2   Smokeless Tobacco Never     Alcohol Consumption:  Social History     Substance and Sexual Activity   Alcohol Use Yes    Alcohol/week: 2.0 - 3.0 standard drinks of alcohol    Types: 2 - 3 Cans of beer per week    Comment: once every few weeks       Fall Risk Screen  STEADI Fall Risk Assessment was completed, and patient is at LOW risk for falls.Assessment completed on:3/12/2025    Depression Screening   Little interest or pleasure in doing things? Not at all   Feeling down, depressed, or hopeless? Not at all   PHQ-2 Total Score 0      Health Habits and Functional and Cognitive Screening:      3/12/2025     8:15 AM   Functional & Cognitive Status   Do you have difficulty preparing food and eating? No   Do you have difficulty bathing yourself, getting dressed or grooming yourself? No   Do you have difficulty using the toilet? No   Do you have difficulty moving around from place to place? No   Do you " have trouble with steps or getting out of a bed or a chair? No   Current Diet Well Balanced Diet   Dental Exam Up to date        Dental Exam Comment may 2024   Eye Exam Up to date        Eye Exam Comment may 2024   Exercise (times per week) 7 times per week   Current Exercises Include Walking   Do you need help using the phone?  No   Are you deaf or do you have serious difficulty hearing?  No   Do you need help to go to places out of walking distance? No   Do you need help shopping? No   Do you need help preparing meals?  No   Do you need help with housework?  No   Do you need help with laundry? No   Do you need help taking your medications? No   Do you need help managing money? No   Do you ever drive or ride in a car without wearing a seat belt? No   Have you felt unusual stress, anger or loneliness in the last month? No   Who do you live with? Spouse   If you need help, do you have trouble finding someone available to you? No   Have you been bothered in the last four weeks by sexual problems? No   Do you have difficulty concentrating, remembering or making decisions? No           Age-appropriate Screening Schedule:  Refer to the list below for future screening recommendations based on patient's age, sex and/or medical conditions. Orders for these recommended tests are listed in the plan section. The patient has been provided with a written plan.    Health Maintenance List  Health Maintenance   Topic Date Due    ANNUAL WELLNESS VISIT  10/23/2024    COVID-19 Vaccine (9 - 2024-25 season) 04/09/2025    BMI FOLLOWUP  07/08/2025    COLORECTAL CANCER SCREENING  03/13/2028    TDAP/TD VACCINES (3 - Td or Tdap) 05/09/2033    HEPATITIS C SCREENING  Completed    INFLUENZA VACCINE  Completed    Pneumococcal Vaccine 50+  Completed    AAA SCREEN ONCE  Completed    ZOSTER VACCINE  Completed                                                                                                                                             "    CMS Preventative Services Quick Reference  Risk Factors Identified During Encounter  Fall Risk-High or Moderate: Discussed Fall Prevention in the home    The above risks/problems have been discussed with the patient.  Pertinent information has been shared with the patient in the After Visit Summary.  An After Visit Summary and PPPS were made available to the patient.    Follow Up:   Next Medicare Wellness visit to be scheduled in 1 year.         Additional E&M Note during same encounter follows:  Patient has additional, significant, and separately identifiable condition(s)/problem(s) that require work above and beyond the Medicare Wellness Visit     Chief Complaint  Medicare Wellness-subsequent (Possible pink eye- left )    Subjective   Hip and back pain.  States that it ocmes and goes.  Has been taking care of his granddaughter, so has been more active than previous which she feels may be contributing to some of his issues.    Does check BP at home and numbers run around 123-141/80s.                      Objective   Vital Signs:  /99   Pulse 78   Temp 98 °F (36.7 °C)   Ht 180.3 cm (71\")   Wt 83.5 kg (184 lb)   SpO2 93%   BMI 25.66 kg/m²   Physical Exam  Vitals and nursing note reviewed.   Constitutional:       Appearance: Normal appearance.   HENT:      Head: Normocephalic and atraumatic.   Eyes:      General: Lids are normal.      Conjunctiva/sclera: Conjunctivae normal.   Cardiovascular:      Rate and Rhythm: Normal rate and regular rhythm.   Pulmonary:      Effort: Pulmonary effort is normal.      Breath sounds: Normal breath sounds and air entry.   Abdominal:      General: Abdomen is flat. Bowel sounds are normal.      Palpations: Abdomen is soft.   Musculoskeletal:      Cervical back: Full passive range of motion without pain and normal range of motion.   Neurological:      General: No focal deficit present.      Mental Status: He is alert and oriented to person, place, and time.   Psychiatric: "         Attention and Perception: Attention normal.         Mood and Affect: Mood normal.         Behavior: Behavior normal. Behavior is cooperative.                    Assessment and Plan      Primary hypertension      Orders:    CBC (No Diff); Future    Comprehensive metabolic panel; Future    Lipid panel; Future    lisinopril (PRINIVIL,ZESTRIL) 40 MG tablet; Take 1 tablet by mouth Daily.    Prostate cancer screening    Orders:    PSA Screen; Future            Follow Up   Return in about 6 months (around 9/12/2025).  Patient was given instructions and counseling regarding his condition or for health maintenance advice. Please see specific information pulled into the AVS if appropriate.

## 2025-03-13 LAB
ALBUMIN SERPL-MCNC: 4.3 G/DL (ref 3.5–5.2)
ALBUMIN/GLOB SERPL: 1.6 G/DL
ALP SERPL-CCNC: 81 U/L (ref 39–117)
ALT SERPL W P-5'-P-CCNC: 13 U/L (ref 1–41)
ANION GAP SERPL CALCULATED.3IONS-SCNC: 15.9 MMOL/L (ref 5–15)
AST SERPL-CCNC: 22 U/L (ref 1–40)
BILIRUB SERPL-MCNC: 0.7 MG/DL (ref 0–1.2)
BUN SERPL-MCNC: 18 MG/DL (ref 8–23)
BUN/CREAT SERPL: 15.3 (ref 7–25)
CALCIUM SPEC-SCNC: 9.8 MG/DL (ref 8.6–10.5)
CHLORIDE SERPL-SCNC: 104 MMOL/L (ref 98–107)
CHOLEST SERPL-MCNC: 139 MG/DL (ref 0–200)
CO2 SERPL-SCNC: 24.1 MMOL/L (ref 22–29)
CREAT SERPL-MCNC: 1.18 MG/DL (ref 0.76–1.27)
EGFRCR SERPLBLD CKD-EPI 2021: 66 ML/MIN/1.73
GLOBULIN UR ELPH-MCNC: 2.7 GM/DL
GLUCOSE SERPL-MCNC: 99 MG/DL (ref 65–99)
HDLC SERPL-MCNC: 36 MG/DL (ref 40–60)
LDLC SERPL CALC-MCNC: 89 MG/DL (ref 0–100)
LDLC/HDLC SERPL: 2.46 {RATIO}
POTASSIUM SERPL-SCNC: 4.2 MMOL/L (ref 3.5–5.2)
PROT SERPL-MCNC: 7 G/DL (ref 6–8.5)
PSA SERPL-MCNC: 1.58 NG/ML (ref 0–4)
SODIUM SERPL-SCNC: 144 MMOL/L (ref 136–145)
TRIGL SERPL-MCNC: 72 MG/DL (ref 0–150)
VLDLC SERPL-MCNC: 14 MG/DL (ref 5–40)

## (undated) DEVICE — PATIENT RETURN ELECTRODE, SINGLE-USE, CONTACT QUALITY MONITORING, ADULT, WITH 9FT CORD, FOR PATIENTS WEIGING OVER 33LBS. (15KG): Brand: MEGADYNE

## (undated) DEVICE — DRSNG PAD ABD 8X10IN STRL

## (undated) DEVICE — STERILE PVP: Brand: MEDLINE INDUSTRIES, INC.

## (undated) DEVICE — PAD ARMBRD SURG CONVOL 7.5X20X2IN

## (undated) DEVICE — PUMP PAIN AUTOFUSER AUTO SELCT NOBOLUS 1TO14ML/HR 550ML DISP

## (undated) DEVICE — BNDG ELAS W/CLIP 6IN 10YD LF STRL

## (undated) DEVICE — NEEDLE, QUINCKE, 18GX3.5": Brand: MEDLINE

## (undated) DEVICE — TBG PENCL TELESCP MEGADYNE SMOKE EVAC 10FT

## (undated) DEVICE — STRYKER PERFORMANCE SERIES SAGITTAL BLADE: Brand: STRYKER PERFORMANCE SERIES

## (undated) DEVICE — TRY EPID SFTY 18G 3.5IN 1T7680

## (undated) DEVICE — TRAP FLD MINIVAC MEGADYNE 100ML

## (undated) DEVICE — ANTIBACTERIAL UNDYED BRAIDED (POLYGLACTIN 910), SYNTHETIC ABSORBABLE SUTURE: Brand: COATED VICRYL

## (undated) DEVICE — PK KN TOTL 10

## (undated) DEVICE — 3 BONE CEMENT MIXER: Brand: MIXEVAC

## (undated) DEVICE — Device

## (undated) DEVICE — BLANKT WARM UPPR/BDY ARM/OUT 57X196CM

## (undated) DEVICE — SYS CLS SKIN PREMIERPRO EXOFINFUSION 22CM

## (undated) DEVICE — UNDERCAST PADDING: Brand: DEROYAL

## (undated) DEVICE — GLV SURG PREMIERPRO MIC LTX PF SZ8 BRN